# Patient Record
Sex: FEMALE | Race: WHITE | NOT HISPANIC OR LATINO | Employment: UNEMPLOYED | URBAN - METROPOLITAN AREA
[De-identification: names, ages, dates, MRNs, and addresses within clinical notes are randomized per-mention and may not be internally consistent; named-entity substitution may affect disease eponyms.]

---

## 2017-12-16 ENCOUNTER — HOSPITAL ENCOUNTER (EMERGENCY)
Facility: MEDICAL CENTER | Age: 32
End: 2017-12-16
Attending: EMERGENCY MEDICINE
Payer: MEDICAID

## 2017-12-16 VITALS
SYSTOLIC BLOOD PRESSURE: 145 MMHG | WEIGHT: 171.3 LBS | OXYGEN SATURATION: 98 % | HEART RATE: 105 BPM | TEMPERATURE: 99.2 F | HEIGHT: 59 IN | BODY MASS INDEX: 34.53 KG/M2 | DIASTOLIC BLOOD PRESSURE: 81 MMHG | RESPIRATION RATE: 16 BRPM

## 2017-12-16 DIAGNOSIS — F33.2 SEVERE EPISODE OF RECURRENT MAJOR DEPRESSIVE DISORDER, WITHOUT PSYCHOTIC FEATURES (HCC): ICD-10-CM

## 2017-12-16 DIAGNOSIS — R07.9 CHEST PAIN, UNSPECIFIED TYPE: ICD-10-CM

## 2017-12-16 LAB — EKG IMPRESSION: NORMAL

## 2017-12-16 PROCEDURE — 90791 PSYCH DIAGNOSTIC EVALUATION: CPT

## 2017-12-16 PROCEDURE — 93005 ELECTROCARDIOGRAM TRACING: CPT | Performed by: EMERGENCY MEDICINE

## 2017-12-16 PROCEDURE — 99284 EMERGENCY DEPT VISIT MOD MDM: CPT

## 2017-12-16 RX ORDER — LAMOTRIGINE 25 MG/1
25 TABLET ORAL 2 TIMES DAILY
Qty: 60 TAB | Refills: 0 | Status: SHIPPED | OUTPATIENT
Start: 2017-12-16 | End: 2018-01-15

## 2017-12-16 ASSESSMENT — PAIN SCALES - GENERAL: PAINLEVEL_OUTOF10: 0
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
## 2017-12-16 NOTE — ED NOTES
Pt ambulatory to triage with mother & son.  Pt reports feeling suicidal x 4 days with no organized plan.  States she has had constant thoughts but is too scared to do anything.  Reports cutting.  Denies etoh/drug use.  Sad score 4, charge RN aware.  Pt taken to triage 2 for [facility] evaluation.

## 2017-12-16 NOTE — DISCHARGE PLANNING
[facility]  Crisis/Safety Plan    Name:  Lindsey Gomes  MRN:  [id number]  Date:  2017    Warning signs that a crisis may be developing for me or I may be at risk:  1) my anger explodes  2)  Irritability esculates  3) thinking of harming self and actually doing it.    Coping strategies I can use on my own (relaxation, physical activity, etc):  1) meditation  2) taking walk  3) watching fun movie    Ways I can make my environment safe:  1) take walk be fore exploding  2) stop and breath  3) leave the home overnight    Things I want to tell myself when I feel a crisis developin) I desire to be better parent  2) I am a better cook  3) I have a good sense of humor    People I can contact for support or distraction (and their phone numbers):  1) Brianna Gomes [phone]  2) Didier santo  [phone]  3)    If I’m not able to reach my support people, or the above strategies don’t help, I can contact the following professionals, agencies, or hotlines:  1) Crisis Call Center ():  [phone] -OR- [phone]) [phone]  2) Crisis Text Line ():  Text START to 813812  3) -[phone]  4) [facility] [phone]  ([phone])    Howard Levin R.N.

## 2017-12-16 NOTE — ED PROVIDER NOTES
ED Provider Note    Scribed for Hilton Qureshi M.D. by Sidra Deleon. 12/16/2017  12:28 PM    Primary care provider: VENECIA Ochoa  Means of arrival: walk in   History obtained from: patient  History limited by: none    CHIEF COMPLAINT  Chief Complaint   Patient presents with   • Suicidal Ideation     x 3 days      HPI  Lindsey Gomes is a 32 y.o. female who presents to the Emergency Department for suicidal ideation onset three days ago. She notes that the night before last she attempted to overdose on her Metoprolol.  Her ex stepped in and helped calm her down and she did not overdose.  She notes that she has struggled with suicidality for the last five years but she notes that it has worsened over the last several weeks. The patient's mental conditions were successfully treated in mental health court 5 years ago but has recently lost therapy care and has consequently not had her Lamictal since June. She notes that this has made her symptoms and mood very irregular. The patient states that she is not currently suicidal and she feels that if she can be discharged with resources and her lamictal prescription then she will not be concerned about harming herself.  She states that over the last week she has experienced short episodes of chest pain that she describes as sharp and shooting.  She notes that a familial history of heart disease. She denies any shortness of breath or leg swelling.     REVIEW OF SYSTEMS  Pertinent positives include SI, chest pain. Pertinent negatives include no shortness of breath, leg swelling.  All other systems reviewed and negative. C    PAST MEDICAL HISTORY   has a past medical history of Anxiety; Bipolar affective; Depression; Heart murmur; Hypertension; and Pregnancy with nephrolithiasis (12/17/2012).    SURGICAL HISTORY   has a past surgical history that includes lumpectomy (2008); rhinoplasty (2005); craniotomy; and repeat c section  "(11/6/2014).    SOCIAL HISTORY  Social History   Substance Use Topics   • Smoking status: Never Smoker   • Alcohol use No      History   Drug Use No     FAMILY HISTORY  Family History   Problem Relation Age of Onset   • Diabetes Mother    • Thyroid Mother    • Depression Father    • Depression Maternal Grandmother    • Heart Disease Maternal Grandfather      CURRENT MEDICATIONS  No current facility-administered medications on file prior to encounter.      Current Outpatient Prescriptions on File Prior to Encounter   Medication Sig Dispense Refill   • metoprolol SR (TOPROL XL) 50 MG TABLET SR 24 HR Take 50 mg by mouth every day.     • ibuprofen (MOTRIN) 600 MG TABS Take 1 Tab by mouth every 6 hours as needed for Mild Pain. 60 Tab 0   • oxycodone-acetaminophen (PERCOCET) 5-325 MG TABS Take 1-2 Tabs by mouth every four hours as needed. 45 Tab 0       ALLERGIES  Allergies   Allergen Reactions   • Pcn [Penicillins] Hives and Rash     PHYSICAL EXAM  VITAL SIGNS: /81   Pulse (!) 105   Temp 37.3 °C (99.2 °F) (Temporal)   Resp 16   Ht 1.499 m (4' 11\")   Wt 77.7 kg (171 lb 4.8 oz)   SpO2 98%   BMI 34.60 kg/m²     Constitutional: Well developed, Well nourished, mild  distress, Non-toxic appearance.   HENT: Normocephalic, Atraumatic, Bilateral external ears normal, Oropharynx moist, No oral exudates.   Eyes: PERRLA, EOMI, Conjunctiva normal, No discharge.   Neck: No tenderness, Supple, No stridor.   Lymphatic: No lymphadenopathy noted.   Cardiovascular: Normal heart rate, Normal rhythm.   Thorax & Lungs: Clear to auscultation bilaterally, No respiratory distress, No wheezing, No crackles. Slight anterior chest wall tenderness  Abdomen: Soft, No tenderness, No masses, No pulsatile masses.   Skin: Warm, Dry, No erythema, No rash.  Left arm has old appearing superficial abrasions  Extremities:, No edema No cyanosis.   Musculoskeletal: No tenderness to palpation or major deformities noted.  Intact distal " "pulses  Neurologic: Awake, alert. Moves all extremities spontaneously.  Psychiatric: Poor eye contact, fairly good insight    LABS  Results for orders placed or performed during the hospital encounter of 17   EKG (ER)   Result Value Ref Range    Report       [facility] Emergency Dept.    Test Date:  2017  Pt Name:    DALJIT LAWTON             Department: ER  MRN:        [id number]                      Room:       TRIAGE ROOMS  Gender:     F                            Technician: [id number]  :        1985                   Requested By:RUFINO DHALIWAL  Order #:    [id number]                    Reading MD: RUFINO DHALIWAL MD    Measurements  Intervals                                Axis  Rate:       89                           P:          9  LA:         160                          QRS:        23  QRSD:       78                           T:          17  QT:         360  QTc:        439    Interpretive Statements  SINUS RHYTHM  No previous ECG available for comparison    Electronically Signed On 2017 13:16:01 PST by RUFINO DHALIWAL MD     All labs reviewed by me.    COURSE & MEDICAL DECISION MAKING  Pertinent Labs & Imaging studies reviewed. (See chart for details)    I reviewed the patient's medical records    12:28 PM - Patient seen and examined at bedside. [facility] has already discussed resources and a plan for careOrdered EKG to evaluate her symptoms. The differential diagnoses include but are not limited to: anxiety. I discussed plans for discharge with a prescription for Lamictal 25mg tablet. She was given a referral to follow up with mental health services and instructed to return to the ED if her symptoms worsen. Patient understands and agrees. Her vitals prior to discharge are: /81   Pulse (!) 105   Temp 37.3 °C (99.2 °F) (Temporal)   Resp 16   Ht 1.499 m (4' 11\")   Wt 77.7 kg (171 lb 4.8 oz)   SpO2 98%   BMI 34.60 kg/m²     Decision " Making:  Patient with suicidal ideation and she also has some nondescript central chest pain. The patient is not actively suicidal now, we've given the patient resources, we'll restart her medications. I believe she is safe to go home, she has contracted for safety. As for the patient's chest pain, seems very intermittent, sharp, EKG is negative, we'll discharge the patient home, have her follow-up as an outpatient.    The patient will return for new or worsening symptoms and is stable at the time of discharge.      DISPOSITION:  Patient will be discharged home in stable condition.    FOLLOW UP:  [facility], Emergency Dept  [address] [address] [phone]    If symptoms worsen      OUTPATIENT MEDICATIONS:  Discharge Medication List as of 12/16/2017  1:30 PM      START taking these medications    Details   lamotrigine (LAMICTAL) 25 MG Tab Take 1 Tab by mouth 2 times a day for 30 days., Disp-60 Tab, R-0, Print Rx Paper             FINAL IMPRESSION  1. Severe episode of recurrent major depressive disorder, without psychotic features (CMS-HCC)    2. Chest pain, unspecified type          I, Sidra Deleon (Scribe), am scribing for, and in the presence of, Hilton Qureshi M.D..    Electronically signed by: Sidra Deleon (Wandaibtommy), 12/16/2017    IHilton M.D. personally performed the services described in this documentation, as scribed by Sidra Deleon in my presence, and it is both accurate and complete.    The note accurately reflects work and decisions made by me.  Hilton Qureshi  12/16/2017  4:59 PM

## 2017-12-16 NOTE — DISCHARGE INSTRUCTIONS
Please follow-up with your primary care provider for blood pressure management.      Major Depressive Disorder  Major depressive disorder is a mental illness. It also may be called clinical depression or unipolar depression. Major depressive disorder usually causes feelings of sadness, hopelessness, or helplessness. Some people with this disorder do not feel particularly sad but lose interest in doing things they used to enjoy (anhedonia). Major depressive disorder also can cause physical symptoms. It can interfere with work, school, relationships, and other normal everyday activities. The disorder varies in severity but is longer lasting and more serious than the sadness we all feel from time to time in our lives.  Major depressive disorder often is triggered by stressful life events or major life changes. Examples of these triggers include divorce, loss of your job or home, a move, and the death of a family member or close friend. Sometimes this disorder occurs for no obvious reason at all. People who have family members with major depressive disorder or bipolar disorder are at higher risk for developing this disorder, with or without life stressors. Major depressive disorder can occur at any age. It may occur just once in your life (single episode major depressive disorder). It may occur multiple times (recurrent major depressive disorder).  SYMPTOMS  People with major depressive disorder have either anhedonia or depressed mood on nearly a daily basis for at least 2 weeks or longer. Symptoms of depressed mood include:  · Feelings of sadness (blue or down in the dumps) or emptiness.  · Feelings of hopelessness or helplessness.  · Tearfulness or episodes of crying (may be observed by others).  · Irritability (children and adolescents).  In addition to depressed mood or anhedonia or both, people with this disorder have at least four of the following symptoms:  · Difficulty sleeping or sleeping too much.     · Significant change (increase or decrease) in appetite or weight.    · Lack of energy or motivation.  · Feelings of guilt and worthlessness.    · Difficulty concentrating, remembering, or making decisions.  · Unusually slow movement (psychomotor retardation) or restlessness (as observed by others).    · Recurrent wishes for death, recurrent thoughts of self-harm (suicide), or a suicide attempt.  People with major depressive disorder commonly have persistent negative thoughts about themselves, other people, and the world. People with severe major depressive disorder may experience distorted beliefs or perceptions about the world (psychotic delusions). They also may see or hear things that are not real (psychotic hallucinations).  DIAGNOSIS  Major depressive disorder is diagnosed through an assessment by your health care provider. Your health care provider will ask about aspects of your daily life, such as mood, sleep, and appetite, to see if you have the diagnostic symptoms of major depressive disorder. Your health care provider may ask about your medical history and use of alcohol or drugs, including prescription medicines. Your health care provider also may do a physical exam and blood work. This is because certain medical conditions and the use of certain substances can cause major depressive disorder-like symptoms (secondary depression). Your health care provider also may refer you to a mental health specialist for further evaluation and treatment.  TREATMENT  It is important to recognize the symptoms of major depressive disorder and seek treatment. The following treatments can be prescribed for this disorder:    · Medicine. Antidepressant medicines usually are prescribed. Antidepressant medicines are thought to correct chemical imbalances in the brain that are commonly associated with major depressive disorder. Other types of medicine may be added if the symptoms do not respond to antidepressant medicines  alone or if psychotic delusions or hallucinations occur.  · Talk therapy. Talk therapy can be helpful in treating major depressive disorder by providing support, education, and guidance. Certain types of talk therapy also can help with negative thinking (cognitive behavioral therapy) and with relationship issues that trigger this disorder (interpersonal therapy).  A mental health specialist can help determine which treatment is best for you. Most people with major depressive disorder do well with a combination of medicine and talk therapy. Treatments involving electrical stimulation of the brain can be used in situations with extremely severe symptoms or when medicine and talk therapy do not work over time. These treatments include electroconvulsive therapy, transcranial magnetic stimulation, and vagal nerve stimulation.     This information is not intended to replace advice given to you by your health care provider. Make sure you discuss any questions you have with your health care provider.     Document Released: 04/14/2014 Document Revised: 01/08/2016 Document Reviewed: 04/14/2014  [employer] Interactive Patient Education ©2016 [employer]

## 2019-02-03 ENCOUNTER — APPOINTMENT (OUTPATIENT)
Dept: URGENT CARE | Facility: CLINIC | Age: 34
End: 2019-02-03
Payer: MEDICAID

## 2019-02-06 ENCOUNTER — OFFICE VISIT (OUTPATIENT)
Dept: URGENT CARE | Facility: CLINIC | Age: 34
End: 2019-02-06
Payer: MEDICAID

## 2019-02-06 VITALS
BODY MASS INDEX: 32.86 KG/M2 | WEIGHT: 163 LBS | HEART RATE: 99 BPM | HEIGHT: 59 IN | DIASTOLIC BLOOD PRESSURE: 90 MMHG | TEMPERATURE: 100.1 F | RESPIRATION RATE: 14 BRPM | OXYGEN SATURATION: 97 % | SYSTOLIC BLOOD PRESSURE: 110 MMHG

## 2019-02-06 DIAGNOSIS — R10.13 EPIGASTRIC PAIN: ICD-10-CM

## 2019-02-06 LAB
APPEARANCE UR: CLEAR
COLOR UR AUTO: NORMAL
GLUCOSE UR STRIP.AUTO-MCNC: NEGATIVE MG/DL
INT CON NEG: NEGATIVE
INT CON POS: POSITIVE
KETONES UR STRIP.AUTO-MCNC: NEGATIVE MG/DL
LEUKOCYTE ESTERASE UR QL STRIP.AUTO: NORMAL
NITRITE UR QL STRIP.AUTO: NEGATIVE
PH UR STRIP.AUTO: 6.5 [PH] (ref 5–8)
POC URINE PREGNANCY TEST: NEGATIVE
PROT UR QL STRIP: NEGATIVE MG/DL
RBC UR QL AUTO: NEGATIVE
SP GR UR STRIP.AUTO: 1.01

## 2019-02-06 PROCEDURE — 81002 URINALYSIS NONAUTO W/O SCOPE: CPT | Performed by: PHYSICIAN ASSISTANT

## 2019-02-06 PROCEDURE — 81025 URINE PREGNANCY TEST: CPT | Performed by: PHYSICIAN ASSISTANT

## 2019-02-06 PROCEDURE — 99204 OFFICE O/P NEW MOD 45 MIN: CPT | Performed by: PHYSICIAN ASSISTANT

## 2019-02-06 RX ORDER — ZIPRASIDONE HYDROCHLORIDE 60 MG/1
60 CAPSULE ORAL 2 TIMES DAILY
COMMUNITY
End: 2019-02-06

## 2019-02-06 RX ORDER — PAROXETINE 10 MG/1
10 TABLET, FILM COATED ORAL DAILY
COMMUNITY
End: 2019-02-06

## 2019-02-06 ASSESSMENT — ENCOUNTER SYMPTOMS
DIARRHEA: 0
ABDOMINAL PAIN: 1
BELCHING: 1
ARTHRALGIAS: 0
ANOREXIA: 1
WEIGHT LOSS: 0
FLATUS: 0
NAUSEA: 0
CONSTIPATION: 0
VOMITING: 0
HEMATOCHEZIA: 0
HEADACHES: 0
FEVER: 0
MYALGIAS: 0

## 2019-02-06 NOTE — PROGRESS NOTES
Subjective:      Lindsey Gomes is a 33 y.o. female who presents with Abdominal Pain (x 1 week pt states she is concered about liver becuase the pain is similar (UQ PAIN))            Abdominal Pain   This is a new problem. The current episode started in the past 7 days. The onset quality is undetermined. The problem occurs intermittently. The problem has been waxing and waning. The pain is located in the epigastric region. The pain is at a severity of 4/10. The pain is mild. The quality of the pain is aching. The abdominal pain does not radiate. Associated symptoms include anorexia and belching. Pertinent negatives include no arthralgias, constipation, diarrhea, dysuria, fever, flatus, frequency, headaches, hematochezia, hematuria, melena, myalgias, nausea, vomiting or weight loss. Associated symptoms comments: Nausea and a cough. Nothing aggravates the pain. The pain is relieved by nothing. She has tried nothing for the symptoms.     Past medical history: Is not pertinent to this examination  Past surgical history: Not pertinent to this examination  Family history: Is not pertinent to this examination  Allergies: No known drug allergies  Social history: Is reviewed in Epic  Meds:   Current Outpatient Prescriptions on File Prior to Visit   Medication Sig Dispense Refill   • metoprolol SR (TOPROL XL) 50 MG TABLET SR 24 HR Take 50 mg by mouth every day.     • ibuprofen (MOTRIN) 600 MG TABS Take 1 Tab by mouth every 6 hours as needed for Mild Pain. (Patient not taking: Reported on 2/6/2019) 60 Tab 0   • oxycodone-acetaminophen (PERCOCET) 5-325 MG TABS Take 1-2 Tabs by mouth every four hours as needed. (Patient not taking: Reported on 2/6/2019) 45 Tab 0     No current facility-administered medications on file prior to visit.          Review of Systems   Constitutional: Negative for fever and weight loss.   Gastrointestinal: Positive for abdominal pain and anorexia. Negative for constipation, diarrhea, flatus,  "hematochezia, melena, nausea and vomiting.   Genitourinary: Negative for dysuria, frequency and hematuria.   Musculoskeletal: Negative for arthralgias and myalgias.   Neurological: Negative for headaches.          Objective:     /90   Pulse 99   Temp 37.8 °C (100.1 °F)   Resp 14   Ht 1.499 m (4' 11\")   Wt 73.9 kg (163 lb)   SpO2 97%   BMI 32.92 kg/m²      Physical Exam   Constitutional: She is oriented to person, place, and time. She appears well-developed and well-nourished.   HENT:   Head: Normocephalic and atraumatic.   Eyes: Pupils are equal, round, and reactive to light. EOM are normal.   Neck: Normal range of motion.   Cardiovascular: Normal rate, regular rhythm and normal heart sounds.    Pulmonary/Chest: Breath sounds normal.   Abdominal: Soft. She exhibits no distension and no mass. There is tenderness.       Musculoskeletal: Normal range of motion.   Neurological: She is alert and oriented to person, place, and time.   Skin: Skin is warm. Capillary refill takes less than 2 seconds.          Gen.: Patient is A and O ×3, no acute distress, well-nourished well-hydrated  Vitals: Are listed and unremarkable  HEENT: Heads normocephalic, atraumatic, PERRLA, extraocular movements intact, TMs and oropharynx clear  Neck: Soft supple without cervical lymphadenopathy  Cardiovascular: Regular rate and rhythm normal S1 and S2. No murmurs, rubs or gallops  Lungs are clear to auscultation bilaterally. no wheezes rales or rhonchi  Abdomen is soft, tender in the periumbilical region. negative murphys. Good bowel sunds  Skin: Is well perfused without evidence of rash or lesions  Neurological:  cranial nerves II through XII were assessed and intact.  Musculoskeletal: Full range of motion, 5 out of 5 strength against resistance  Neurovascularly: Intact with a 2 second cap refill, good distal pulses    Urgent CARE course: Urinalysis is unremarkable and urine pregnancy is negative    Assessment/Plan:     1. " Epigastric pain    - US-ABDOMEN COMPLETE SURVEY; Future  - CBC WITH DIFFERENTIAL; Future  - Comp Metabolic Panel; Future  If symptoms persist or worsen please go straight to the emergency room

## 2019-02-06 NOTE — LETTER
February 6, 2019         Patient: Lindsey Gomes   YOB: 1985   Date of Visit: 2/6/2019           To Whom it May Concern:    Lindsey Gomes was seen in my clinic on 2/6/2019. She may return to work on 2/7/2019. Please excuse for today.    If you have any questions or concerns, please don't hesitate to call.        Sincerely,           Ladi Walsh P.A.-C.  Electronically Signed

## 2019-06-25 PROCEDURE — 99284 EMERGENCY DEPT VISIT MOD MDM: CPT

## 2019-06-26 ENCOUNTER — HOSPITAL ENCOUNTER (EMERGENCY)
Facility: MEDICAL CENTER | Age: 34
End: 2019-06-26
Attending: EMERGENCY MEDICINE
Payer: MEDICAID

## 2019-06-26 VITALS
HEART RATE: 116 BPM | OXYGEN SATURATION: 96 % | RESPIRATION RATE: 18 BRPM | SYSTOLIC BLOOD PRESSURE: 139 MMHG | HEIGHT: 59 IN | BODY MASS INDEX: 34.67 KG/M2 | TEMPERATURE: 99.7 F | DIASTOLIC BLOOD PRESSURE: 84 MMHG | WEIGHT: 171.96 LBS

## 2019-06-26 DIAGNOSIS — K62.5 RECTAL BLEEDING: ICD-10-CM

## 2019-06-26 DIAGNOSIS — M54.50 ACUTE LOW BACK PAIN WITHOUT SCIATICA, UNSPECIFIED BACK PAIN LATERALITY: ICD-10-CM

## 2019-06-26 DIAGNOSIS — N39.0 URINARY TRACT INFECTION WITHOUT HEMATURIA, SITE UNSPECIFIED: ICD-10-CM

## 2019-06-26 LAB
ALBUMIN SERPL BCP-MCNC: 4.3 G/DL (ref 3.2–4.9)
ALBUMIN/GLOB SERPL: 1.6 G/DL
ALP SERPL-CCNC: 44 U/L (ref 30–99)
ALT SERPL-CCNC: 18 U/L (ref 2–50)
ANION GAP SERPL CALC-SCNC: 10 MMOL/L (ref 0–11.9)
APPEARANCE UR: CLEAR
AST SERPL-CCNC: 19 U/L (ref 12–45)
BACTERIA #/AREA URNS HPF: ABNORMAL /HPF
BASOPHILS # BLD AUTO: 0.3 % (ref 0–1.8)
BASOPHILS # BLD: 0.02 K/UL (ref 0–0.12)
BILIRUB SERPL-MCNC: 0.3 MG/DL (ref 0.1–1.5)
BILIRUB UR QL STRIP.AUTO: NEGATIVE
BUN SERPL-MCNC: 13 MG/DL (ref 8–22)
CALCIUM SERPL-MCNC: 9.2 MG/DL (ref 8.5–10.5)
CHLORIDE SERPL-SCNC: 104 MMOL/L (ref 96–112)
CO2 SERPL-SCNC: 23 MMOL/L (ref 20–33)
COLOR UR: YELLOW
CREAT SERPL-MCNC: 0.88 MG/DL (ref 0.5–1.4)
EOSINOPHIL # BLD AUTO: 0.04 K/UL (ref 0–0.51)
EOSINOPHIL NFR BLD: 0.7 % (ref 0–6.9)
EPI CELLS #/AREA URNS HPF: ABNORMAL /HPF
ERYTHROCYTE [DISTWIDTH] IN BLOOD BY AUTOMATED COUNT: 38.4 FL (ref 35.9–50)
GLOBULIN SER CALC-MCNC: 2.7 G/DL (ref 1.9–3.5)
GLUCOSE SERPL-MCNC: 132 MG/DL (ref 65–99)
GLUCOSE UR STRIP.AUTO-MCNC: NEGATIVE MG/DL
HCG SERPL QL: NEGATIVE
HCT VFR BLD AUTO: 41.9 % (ref 37–47)
HGB BLD-MCNC: 13.8 G/DL (ref 12–16)
HYALINE CASTS #/AREA URNS LPF: ABNORMAL /LPF
IMM GRANULOCYTES # BLD AUTO: 0.01 K/UL (ref 0–0.11)
IMM GRANULOCYTES NFR BLD AUTO: 0.2 % (ref 0–0.9)
KETONES UR STRIP.AUTO-MCNC: NEGATIVE MG/DL
LEUKOCYTE ESTERASE UR QL STRIP.AUTO: ABNORMAL
LIPASE SERPL-CCNC: 21 U/L (ref 11–82)
LYMPHOCYTES # BLD AUTO: 0.55 K/UL (ref 1–4.8)
LYMPHOCYTES NFR BLD: 9.1 % (ref 22–41)
MCH RBC QN AUTO: 29.9 PG (ref 27–33)
MCHC RBC AUTO-ENTMCNC: 32.9 G/DL (ref 33.6–35)
MCV RBC AUTO: 90.7 FL (ref 81.4–97.8)
MICRO URNS: ABNORMAL
MONOCYTES # BLD AUTO: 0.11 K/UL (ref 0–0.85)
MONOCYTES NFR BLD AUTO: 1.8 % (ref 0–13.4)
NEUTROPHILS # BLD AUTO: 5.3 K/UL (ref 2–7.15)
NEUTROPHILS NFR BLD: 87.9 % (ref 44–72)
NITRITE UR QL STRIP.AUTO: NEGATIVE
NRBC # BLD AUTO: 0 K/UL
NRBC BLD-RTO: 0 /100 WBC
PH UR STRIP.AUTO: 5 [PH]
PLATELET # BLD AUTO: 132 K/UL (ref 164–446)
PMV BLD AUTO: 10.7 FL (ref 9–12.9)
POTASSIUM SERPL-SCNC: 3.5 MMOL/L (ref 3.6–5.5)
PROT SERPL-MCNC: 7 G/DL (ref 6–8.2)
PROT UR QL STRIP: NEGATIVE MG/DL
RBC # BLD AUTO: 4.62 M/UL (ref 4.2–5.4)
RBC # URNS HPF: ABNORMAL /HPF
RBC UR QL AUTO: NEGATIVE
SODIUM SERPL-SCNC: 137 MMOL/L (ref 135–145)
SP GR UR STRIP.AUTO: 1.02
UROBILINOGEN UR STRIP.AUTO-MCNC: 0.2 MG/DL
WBC # BLD AUTO: 6 K/UL (ref 4.8–10.8)
WBC #/AREA URNS HPF: ABNORMAL /HPF

## 2019-06-26 PROCEDURE — 87086 URINE CULTURE/COLONY COUNT: CPT

## 2019-06-26 PROCEDURE — 700102 HCHG RX REV CODE 250 W/ 637 OVERRIDE(OP): Performed by: EMERGENCY MEDICINE

## 2019-06-26 PROCEDURE — 80053 COMPREHEN METABOLIC PANEL: CPT

## 2019-06-26 PROCEDURE — 83690 ASSAY OF LIPASE: CPT

## 2019-06-26 PROCEDURE — A9270 NON-COVERED ITEM OR SERVICE: HCPCS | Performed by: EMERGENCY MEDICINE

## 2019-06-26 PROCEDURE — 96372 THER/PROPH/DIAG INJ SC/IM: CPT

## 2019-06-26 PROCEDURE — 700111 HCHG RX REV CODE 636 W/ 250 OVERRIDE (IP): Performed by: EMERGENCY MEDICINE

## 2019-06-26 PROCEDURE — 36415 COLL VENOUS BLD VENIPUNCTURE: CPT

## 2019-06-26 PROCEDURE — 85025 COMPLETE CBC W/AUTO DIFF WBC: CPT

## 2019-06-26 PROCEDURE — 81001 URINALYSIS AUTO W/SCOPE: CPT

## 2019-06-26 PROCEDURE — 87186 SC STD MICRODIL/AGAR DIL: CPT

## 2019-06-26 PROCEDURE — 84703 CHORIONIC GONADOTROPIN ASSAY: CPT

## 2019-06-26 PROCEDURE — 87077 CULTURE AEROBIC IDENTIFY: CPT

## 2019-06-26 RX ORDER — HYDROXYZINE HYDROCHLORIDE 25 MG/1
25 TABLET, FILM COATED ORAL 3 TIMES DAILY PRN
COMMUNITY

## 2019-06-26 RX ORDER — NITROFURANTOIN 25; 75 MG/1; MG/1
100 CAPSULE ORAL ONCE
Status: COMPLETED | OUTPATIENT
Start: 2019-06-26 | End: 2019-06-26

## 2019-06-26 RX ORDER — ZIPRASIDONE HYDROCHLORIDE 60 MG/1
60 CAPSULE ORAL EVERY EVENING
Status: SHIPPED | COMMUNITY
End: 2021-08-29

## 2019-06-26 RX ORDER — NITROFURANTOIN 25; 75 MG/1; MG/1
100 CAPSULE ORAL 2 TIMES DAILY
Qty: 14 CAP | Refills: 0 | Status: SHIPPED | OUTPATIENT
Start: 2019-06-26 | End: 2019-07-03

## 2019-06-26 RX ORDER — KETOROLAC TROMETHAMINE 30 MG/ML
30 INJECTION, SOLUTION INTRAMUSCULAR; INTRAVENOUS ONCE
Status: COMPLETED | OUTPATIENT
Start: 2019-06-26 | End: 2019-06-26

## 2019-06-26 RX ORDER — DIPHENHYDRAMINE HYDROCHLORIDE 50 MG/ML
50 INJECTION INTRAMUSCULAR; INTRAVENOUS ONCE
Status: COMPLETED | OUTPATIENT
Start: 2019-06-26 | End: 2019-06-26

## 2019-06-26 RX ORDER — CYCLOBENZAPRINE HCL 10 MG
10 TABLET ORAL ONCE
Status: COMPLETED | OUTPATIENT
Start: 2019-06-26 | End: 2019-06-26

## 2019-06-26 RX ORDER — PRAZOSIN HYDROCHLORIDE 2 MG/1
2 CAPSULE ORAL NIGHTLY
Status: SHIPPED | COMMUNITY
End: 2022-02-19

## 2019-06-26 RX ADMIN — KETOROLAC TROMETHAMINE 30 MG: 30 INJECTION, SOLUTION INTRAMUSCULAR at 01:22

## 2019-06-26 RX ADMIN — CYCLOBENZAPRINE 10 MG: 10 TABLET, FILM COATED ORAL at 01:22

## 2019-06-26 RX ADMIN — DIPHENHYDRAMINE HYDROCHLORIDE 50 MG: 50 INJECTION INTRAMUSCULAR; INTRAVENOUS at 01:22

## 2019-06-26 RX ADMIN — NITROFURANTOIN MONOHYDRATE/MACROCRYSTALLINE 100 MG: 25; 75 CAPSULE ORAL at 01:38

## 2019-06-26 ASSESSMENT — ENCOUNTER SYMPTOMS
FEVER: 0
ROS GI COMMENTS: POSITIVE FOR RECTAL BLEEDING.
HEADACHES: 0
DIARRHEA: 1
CONSTIPATION: 0
SHORTNESS OF BREATH: 0
BACK PAIN: 1

## 2019-06-26 NOTE — ED PROVIDER NOTES
"ED Provider Note    Scribed for Juliette Schultz M.D. by Anna Kapoor. 6/26/2019, 12:33 AM.    Primary care provider: VENECIA Ochoa  Means of arrival: Walk in  History obtained from: Patient  History limited by: None    CHIEF COMPLAINT  Chief Complaint   Patient presents with   • Low Back Pain     in the kidney area x1 day   • Rectal Bleeding     noticed a small blood clot in underwear       HPI  Lindsey Gomes is a 33 y.o. female who presents to the Emergency Department for evaluation of lower back pain onset 3-4 days ago. She rates the pain as an 8-9/10 in severity and describes it as \"burning\" in quality. The patient denies any strenuous activities prior to onset of the pain.  Denies abdominal pain, nausea, vomiting, dysuria, and changes in bowel movements.    She additionally has complaints of intermittent rectal bleeding that has been ongoing for a month.  The patient reports that she has no prior history of abdominal bleeding or ulcers.  She denies constipation.    REVIEW OF SYSTEMS  Review of Systems   Constitutional: Negative for fever.   Respiratory: Negative for shortness of breath.    Cardiovascular: Negative for chest pain.   Gastrointestinal: Positive for diarrhea. Negative for constipation.        Positive for rectal bleeding.    Musculoskeletal: Positive for back pain (lower).   Neurological: Negative for headaches.   All other systems reviewed and are negative.    PAST MEDICAL HISTORY   has a past medical history of Anxiety; Bipolar affective (HCC); Depression; Heart murmur; Hypertension; and Pregnancy with nephrolithiasis (12/17/2012).    SURGICAL HISTORY   has a past surgical history that includes lumpectomy (2008); rhinoplasty (2005); craniotomy; and repeat c section (11/6/2014).    SOCIAL HISTORY  Social History   Substance Use Topics   • Smoking status: Never Smoker   • Smokeless tobacco: Never Used   • Alcohol use No      History   Drug Use No       FAMILY HISTORY  Family " "History   Problem Relation Age of Onset   • Diabetes Mother    • Thyroid Mother    • Depression Father    • Depression Maternal Grandmother    • Heart Disease Maternal Grandfather        CURRENT MEDICATIONS  Home Medications     Reviewed by Niurka Cerna R.N. (Registered Nurse) on 06/26/19 at 0017  Med List Status: Not Addressed   Medication Last Dose Status   hydrOXYzine HCl (ATARAX) 25 MG Tab 6/26/2019 Active   metoprolol SR (TOPROL XL) 50 MG TABLET SR 24 HR 6/26/2019 Active   prazosin (MINIPRESS) 2 MG Cap 6/26/2019 Active   ziprasidone (GEODON) 60 MG Cap 4/16/2019 Active                ALLERGIES  Allergies   Allergen Reactions   • Pcn [Penicillins] Hives and Rash       PHYSICAL EXAM  VITAL SIGNS: /87   Pulse (!) 127   Temp 37.6 °C (99.7 °F) (Temporal)   Resp 16   Ht 1.499 m (4' 11\")   Wt 78 kg (171 lb 15.3 oz)   SpO2 96%   BMI 34.73 kg/m²   Vitals reviewed by myself.  Physical Exam   Nursing note and vitals reviewed.  Constitutional: Well-developed and well-nourished. No acute distress.   HENT: Head is normocephalic and atraumatic.  Eyes: extra-ocular movements intact  Cardiovascular: Tachycardic rate and regular rhythm. No murmur heard.  Pulmonary/Chest: Breath sounds normal. No wheezes or rales.   Abdominal: Soft and non-tender. No distention.  No CVA tenderness bilaterally  Rectal: External hemorrhoids with scant red blood surrounding them, likely source of bleeding. No blood noted on internal rectal exam. No stool available for GUAIAC testing.   Musculoskeletal: Extremities exhibit normal range of motion without edema or tenderness.   Neurological: Awake and alert  Skin: Skin is warm and dry. No rash.       DIAGNOSTIC STUDIES /  LABS  Labs Reviewed   CBC WITH DIFFERENTIAL - Abnormal; Notable for the following:        Result Value    MCHC 32.9 (*)     Platelet Count 132 (*)     Neutrophils-Polys 87.90 (*)     Lymphocytes 9.10 (*)     Lymphs (Absolute) 0.55 (*)     All other components " within normal limits   COMP METABOLIC PANEL - Abnormal; Notable for the following:     Potassium 3.5 (*)     Glucose 132 (*)     All other components within normal limits   URINALYSIS,CULTURE IF INDICATED - Abnormal; Notable for the following:     Leukocyte Esterase Small (*)     All other components within normal limits   URINE MICROSCOPIC (W/UA) - Abnormal; Notable for the following:     WBC 10-20 (*)     Bacteria Many (*)     Epithelial Cells Moderate (*)     Hyaline Cast 6-10 (*)     All other components within normal limits   LIPASE   HCG QUAL SERUM   URINE CULTURE(NEW)   ESTIMATED GFR       All labs reviewed by me.      REASSESSMENT  12:35 AM - Patient presents to the ED with lower back pain and rectal bleeding. Patient will be treated with Toradol 30 mg, Flexeril 10 mg, and Benadryl 50 mg. Patient made aware labs will be ordered for further evaluation. She consents to plan.     1:29 AM - Reviewed patient's lab results which indicate UTI. She will be given first dose of Macrobid 100 mg here tonight.    1:37 AM - Patient was reevaluated at bedside. She is resting comfortably in bed. Discussed UA results with the patient that indicate a UTI. She was informed I am still awaiting for all of labs to result, but initial labs of her blood counts were reassuring. The patient was made aware following the rest of her blood work coming back reassuring, she will be discharged home. She is instructed to follow up with PCP. The patient is understanding and agreeable to discharge.     COURSE & MEDICAL DECISION MAKING  Nursing notes, VS, PMSFHx reviewed in chart.    Patient is a 33-year-old female who comes in for low back pain and rectal bleeding.  Differential diagnosis includes hemorrhoid bleeding, lower GI bleed, anemia, musculoskeletal strain, urinary tract infection.  Diagnostic work-up includes labs and urinalysis.      Patient's initial vitals notable for tachycardia in the 120s.  On exam patient is well-appearing and  does not appear to be in discomfort.  I did a chart review and prior records dating back to 2017 demonstrates that patient is chronically tachycardic, therefore I am not concerned about this at this time.  She is treated with oral hydration after which her tachycardia improves to 116.  Of note patient's labs returned and hemoglobin is normal.  I believe the likely source of her intermittent rectal bleeding is external hemorrhoids, however I have advised her to follow-up with gastroenterology for any persistent symptoms as we cannot rule out tumor in the emergency department.  Patient understands and is agreeable to this.  Labs are otherwise unremarkable.  Urinalysis is consistent with infection for which patient is started on Macrobid.  Her back pain was treated with Toradol and Flexeril after which she felt improved.  Patient is advised on management of UTI and given strict return precautions.  She is advised to follow-up with PCP in 1 to 2 days.  Patient is then discharged home in stable condition.    The patient will return for new or worsening symptoms and is stable at the time of discharge.    DISPOSITION:  Patient will be discharged home in stable condition.    FOLLOW UP:  Rosa Levin, AuWPWuN.  [address]   [facility] [address]  [facility] [address]  [phone]            OUTPATIENT MEDICATIONS:  Discharge Medication List as of 6/26/2019  1:42 AM      START taking these medications    Details   nitrofurantoin monohyd macro (MACROBID) 100 MG Cap Take 1 Cap by mouth 2 times a day for 7 days., Disp-14 Cap, R-0, Print Rx Paper               FINAL IMPRESSION  1. Urinary tract infection without hematuria, site unspecified    2. Acute low back pain without sciatica, unspecified back pain laterality    3. Rectal bleeding          Anna WALLER ([place]), am scribing for, and in the presence of, Juliette Schultz M.D..    Electronically signed by: Anna Macias), 6/26/2019    Juliette WALLER M.D. personally performed  the services described in this documentation, as scribed by Anna Kapoor in my presence, and it is both accurate and complete.    C.    The note accurately reflects work and decisions made by me.  Juliette Schultz  6/26/2019  1:53 AM

## 2019-06-26 NOTE — LETTER
6/28/2019               Lindsey Faye Eliseo  [address]  [place] [address]        Dear Lindsey (MR#[id number])    This letter is sent in regards to your, recent visit to the [facility] Emergency Department on 6/25/2019.  During the visit, tests were performed to assist the physician in a medical diagnosis.  A review of those tests requires that we notify you of the following:    Your urine culture was POSITIVE for a bacteria called Escherichia coli. The antibiotic prescribed for you (nitrofurantoin) should be active to treat this bacteria. IT IS IMPORTANT THAT YOU CONTINUE TAKING YOUR ANTIBIOTIC UNTIL IT IS FINISHED.      Please feel free to contact me at the number below if you have any questions or concerns. Thank you for your cooperation in the matter.    Sincerely,  ED Culture Follow-Up Staff  Cleo Charles, PharmD    [facility], Emergency Department  [address], [place] [address]  [phone] (ED Culture Line)  [phone]

## 2019-06-26 NOTE — ED TRIAGE NOTES
"Chief Complaint   Patient presents with   • Low Back Pain     in the kidney area x1 day   • Rectal Bleeding     noticed a small blood clot in underwear     /87   Pulse (!) 127   Temp 37.6 °C (99.7 °F) (Temporal)   Resp 16   Ht 1.499 m (4' 11\")   Wt 78 kg (171 lb 15.3 oz)   SpO2 96%     Pt ambulates with a steady gait to triage with above complaints. No distress noted, states her low back has been hurting and she has been experiencing intermittent cramping x3 days.   "

## 2019-06-28 LAB
BACTERIA UR CULT: ABNORMAL
BACTERIA UR CULT: ABNORMAL
SIGNIFICANT IND 70042: ABNORMAL
SITE SITE: ABNORMAL
SOURCE SOURCE: ABNORMAL

## 2019-06-28 NOTE — ED NOTES
"ED Positive Culture Follow-up/Notification Note:    Date: 6/28/19     Patient seen in the ED on 6/25/2019 for lower back pain x 3 days.   1. Urinary tract infection without hematuria, site unspecified    2. Acute low back pain without sciatica, unspecified back pain laterality    3. Rectal bleeding       Discharge Medication List as of 6/26/2019  1:42 AM      START taking these medications    Details   nitrofurantoin monohyd macro (MACROBID) 100 MG Cap Take 1 Cap by mouth 2 times a day for 7 days., Disp-14 Cap, R-0, Print Rx Paper             Allergies: Pcn [penicillins]     Vitals:    06/25/19 2358 06/26/19 0012 06/26/19 0146   BP: 145/87  139/84   Pulse: (!) 127  (!) 116   Resp: 16  18   Temp: 37.6 °C (99.7 °F)     TempSrc: Temporal     SpO2: 96%     Weight:  78 kg (171 lb 15.3 oz)    Height: 1.499 m (4' 11\")         Final cultures:   Results     Procedure Component Value Units Date/Time    URINE CULTURE(NEW) [[id number]]  (Abnormal)  (Susceptibility) Collected:  06/26/19 0101    Order Status:  Completed Specimen:  Urine Updated:  06/28/19 0838     Significant Indicator POS (POS)     Source UR     Site -     Culture Result - (A)      Escherichia coli  >100,000 cfu/mL   (A)    Culture & Susceptibility     ESCHERICHIA COLI     Antibiotic Sensitivity Microscan Unit Status    Ampicillin Resistant >16 mcg/mL Final    Method: YOUSUF    Cefepime Sensitive <=8 mcg/mL Final    Method: YOUSUF    Cefotaxime Sensitive <=2 mcg/mL Final    Method: YOUSUF    Cefotetan Sensitive <=16 mcg/mL Final    Method: YOUSUF    Ceftazidime Sensitive <=1 mcg/mL Final    Method: YOUSUF    Ceftriaxone Sensitive <=8 mcg/mL Final    Method: YOUSUF    Cefuroxime Sensitive <=4 mcg/mL Final    Method: YOUSUF    Cephalothin Intermediate 16 mcg/mL Final    Method: YOUSUF    Ciprofloxacin Sensitive <=1 mcg/mL Final    Method: YOUSUF    Gentamicin Sensitive <=4 mcg/mL Final    Method: YOUSUF    Levofloxacin Sensitive <=2 mcg/mL Final    Method: YOUSUF    Nitrofurantoin Sensitive <=32 " mcg/mL Final    Method: YOUSUF    Pip/Tazobactam Sensitive <=16 mcg/mL Final    Method: YOUSUF    Piperacillin Resistant >64 mcg/mL Final    Method: YOUSUF    Tigecycline Sensitive <=2 mcg/mL Final    Method: YOUSUF    Tobramycin Sensitive <=4 mcg/mL Final    Method: YOUSUF    Trimeth/Sulfa Sensitive <=2/38 mcg/mL Final    Method: YOUSUF                       URINALYSIS,CULTURE IF INDICATED [[id number]]  (Abnormal) Collected:  06/26/19 0101    Order Status:  Completed Specimen:  Blood Updated:  06/26/19 0123     Color Yellow     Character Clear     Specific Gravity 1.023     Ph 5.0     Glucose Negative mg/dL      Ketones Negative mg/dL      Protein Negative mg/dL      Bilirubin Negative     Urobilinogen, Urine 0.2     Nitrite Negative     Leukocyte Esterase Small (A)     Occult Blood Negative     Micro Urine Req Microscopic          Plan:   Appropriate antibiotic therapy prescribed. No changes required based upon culture result.  Sent letter to patient to notify of positive culture result and encourage compliance with prescribed antibiotics.     Cleo Charles

## 2020-02-14 ENCOUNTER — GYNECOLOGY VISIT (OUTPATIENT)
Dept: OBGYN | Facility: CLINIC | Age: 35
End: 2020-02-14
Payer: MEDICAID

## 2020-02-14 VITALS — WEIGHT: 173 LBS | BODY MASS INDEX: 34.94 KG/M2 | DIASTOLIC BLOOD PRESSURE: 80 MMHG | SYSTOLIC BLOOD PRESSURE: 122 MMHG

## 2020-02-14 DIAGNOSIS — E66.9 OBESITY (BMI 30.0-34.9): ICD-10-CM

## 2020-02-14 DIAGNOSIS — Z30.09 ENCOUNTER FOR OTHER GENERAL COUNSELING OR ADVICE ON CONTRACEPTION: ICD-10-CM

## 2020-02-14 PROCEDURE — 99203 OFFICE O/P NEW LOW 30 MIN: CPT | Performed by: OBSTETRICS & GYNECOLOGY

## 2020-02-14 NOTE — NON-PROVIDER
Pt here today as a New patient  Wants to discuss getting a BTL  Patient states that she doesn't want anymore kids.  Pt states that she has the Nexplanon  Phone # [phone]  Pharmacy verified.  Mirena consent signed and scanned in media.

## 2020-02-14 NOTE — PROGRESS NOTES
Chief Complaint   Patient presents with   • New Patient       History of present illness: 34 y.o.  presents with above chief complaint. Pt is interested in birth control, specifically BTL.  She is currently using Nexplanon.  Birth control methods used in the past are condoms.     PGYNHx: menarche 11, irregular   Last pap 5 years ago, no hx STDs    Review of systems:  Pertinent positives documented in HPI and all other systems reviewed & are negative    All PMH, PSH, allergies, social history and FH reviewed and updated today:  Past Medical History:   Diagnosis Date   • Anxiety     Dx    • Bipolar affective (HCC)    • Depression     Dx in    • Heart murmur     Dx as a child,  Pt states no longer having it.   • Hypertension    • Pregnancy with nephrolithiasis 2012       Past Surgical History:   Procedure Laterality Date   • REPEAT C SECTION  2014    Performed by Stephani Swanson M.D. at [facility]   • LUMPECTOMY  2008    reduction   • RHINOPLASTY     • CRANIOTOMY      open cranium at 3 months old       Allergies:   Allergies   Allergen Reactions   • Pcn [Penicillins] Hives and Rash       Social History     Socioeconomic History   • Marital status: Single     Spouse name: Not on file   • Number of children: Not on file   • Years of education: Not on file   • Highest education level: Not on file   Occupational History   • Not on file   Social Needs   • Financial resource strain: Not on file   • Food insecurity     Worry: Not on file     Inability: Not on file   • Transportation needs     Medical: Not on file     Non-medical: Not on file   Tobacco Use   • Smoking status: Never Smoker   • Smokeless tobacco: Never Used   Substance and Sexual Activity   • Alcohol use: No   • Drug use: No   • Sexual activity: Yes     Partners: Male   Lifestyle   • Physical activity     Days per week: Not on file     Minutes per session: Not on file   • Stress: Not on file   Relationships   • Social  connections     Talks on phone: Not on file     Gets together: Not on file     Attends Restoration service: Not on file     Active member of club or organization: Not on file     Attends meetings of clubs or organizations: Not on file     Relationship status: Not on file   • Intimate partner violence     Fear of current or ex partner: Not on file     Emotionally abused: Not on file     Physically abused: Not on file     Forced sexual activity: Not on file   Other Topics Concern   • Not on file   Social History Narrative    ** Merged History Encounter **         ** Merged History Encounter **            Family History   Problem Relation Age of Onset   • Diabetes Mother    • Thyroid Mother    • Depression Father    • Heart Disease Father    • Diabetes Father    • Heart Disease Maternal Grandfather    • Diabetes Maternal Grandfather        Physical exam:  /80 (BP Location: Right arm, Patient Position: Sitting)   Wt 78.5 kg (173 lb)     GENERAL APPEARANCE: healthy, alert, no distress  ABDOMEN Abdomen soft, non-tender. BS normal. No masses,  No organomegaly  FEMALE GYN: normal female external genitalia without lesions, clear and white vaginal discharge noted, vulva pink without erythema or friability, urethra is normal without discharge or scarring, no bladder fullness or masses, normal uterus, size and consistency, adnexa unable to be palpated due to body habitus, normal anus and perineum.  EXTREMITIES:negative clubbing, cyanosis, edema, nexplanon difficult to palpate in arm due to deep location  NEURO Awake, alert and oriented x 3, Normal gait, no sensory deficits  SKIN No rashes, or ulcers or lesions seen  PSYCHIATRIC: Patient shows appropriate affect, is alert and oriented x3, intact judgment and insight.    Assessment/Plan:  1. Encounter for other general counseling or advice on contraception     2. Obesity (BMI 30.0-34.9)       Patient Orignally wanted BTL, but did not want to undergo the risks.    Counseling/coordination of care of birth control options including medical and surgical methods. Discussed in detail risk and benefits of OCP, NuvaRing, Patch, Depo, IUDs, Nexplanon as well as the normal side effects of each. Questions answered. Patient desires Mirena IUD for birth control and Nexplanon removed. Pt is to use Nexplanon or pelvic rest until placement of product.  Explained location of Nexplanon may prove difficult to remove due to deep location.

## 2020-02-21 LAB — PHYSICIAN READ PAP  881411: NOT DETECTED

## 2020-02-27 ENCOUNTER — GYNECOLOGY VISIT (OUTPATIENT)
Dept: OBGYN | Facility: CLINIC | Age: 35
End: 2020-02-27
Payer: MEDICAID

## 2020-02-27 VITALS — WEIGHT: 174 LBS | BODY MASS INDEX: 35.14 KG/M2 | DIASTOLIC BLOOD PRESSURE: 84 MMHG | SYSTOLIC BLOOD PRESSURE: 126 MMHG

## 2020-02-27 DIAGNOSIS — Z30.09 STERILIZATION CONSULT: ICD-10-CM

## 2020-02-27 PROCEDURE — 99213 OFFICE O/P EST LOW 20 MIN: CPT | Performed by: OBSTETRICS & GYNECOLOGY

## 2020-02-27 NOTE — PROGRESS NOTES
Chief Complaint   Patient presents with   • Gynecologic Exam   Chief complaint: Desires sterilization    History of present illness: 34 y.o.  presents with above chief complaint. Pt is interested in birth control, specifically sterilization.  She is currently using Nexplanon.        PGYNHx: None. last pap 2020, no hx STDs    Review of systems:  Pertinent positives documented in HPI and all other systems reviewed & are negative    All PMH, PSH, allergies, social history and FH reviewed and updated today:  Past Medical History:   Diagnosis Date   • Anxiety     Dx    • Bipolar affective (HCC)    • Depression     Dx in    • Heart murmur     Dx as a child,  Pt states no longer having it.   • Hypertension    • Pregnancy with nephrolithiasis 2012       Past Surgical History:   Procedure Laterality Date   • REPEAT C SECTION  2014    Performed by Stephani Swanson M.D. [facility]   • LUMPECTOMY  2008    reduction   • RHINOPLASTY     • CRANIOTOMY      open cranium at 3 months old       Allergies:   Allergies   Allergen Reactions   • Pcn [Penicillins] Hives and Rash       Social History     Socioeconomic History   • Marital status: Single     Spouse name: Not on file   • Number of children: Not on file   • Years of education: Not on file   • Highest education level: Not on file   Occupational History   • Not on file   Social Needs   • Financial resource strain: Not on file   • Food insecurity     Worry: Not on file     Inability: Not on file   • Transportation needs     Medical: Not on file     Non-medical: Not on file   Tobacco Use   • Smoking status: Never Smoker   • Smokeless tobacco: Never Used   Substance and Sexual Activity   • Alcohol use: No   • Drug use: No   • Sexual activity: Yes     Partners: Male   Lifestyle   • Physical activity     Days per week: Not on file     Minutes per session: Not on file   • Stress: Not on file   Relationships   • Social connections      Talks on phone: Not on file     Gets together: Not on file     Attends Yarsanism service: Not on file     Active member of club or organization: Not on file     Attends meetings of clubs or organizations: Not on file     Relationship status: Not on file   • Intimate partner violence     Fear of current or ex partner: Not on file     Emotionally abused: Not on file     Physically abused: Not on file     Forced sexual activity: Not on file   Other Topics Concern   • Not on file   Social History Narrative    ** Merged History Encounter **         ** Merged History Encounter **            Family History   Problem Relation Age of Onset   • Diabetes Mother    • Thyroid Mother    • Depression Father    • Heart Disease Father    • Diabetes Father    • Heart Disease Maternal Grandfather    • Diabetes Maternal Grandfather        Physical exam:  /84   Wt 78.9 kg (174 lb)     GENERAL APPEARANCE: healthy, alert, no distress  NECK nontender, no masses, thyromegaly or nodules  HEART RRR with normal S1 and S2 ,no murmurs, no gallops, no peripheral edema  LUNG clear to auscultation, normal respiratory effort  ABDOMEN Abdomen soft, non-tender. BS normal. No masses,  No organomegaly  FEMALE GYN: Deferred  EXTREMITIES:negative clubbing, cyanosis, edema    NEURO Awake, alert and oriented x 3, Normal gait, no sensory deficits  SKIN No rashes, or ulcers or lesions seen  PSYCHIATRIC: Patient shows appropriate affect, is alert and oriented x3, intact judgment and insight.    Assessment/Plan:  1. Sterilization consult         Counseling/coordination of care of birth control options including medical and surgical methods. Discussed in detail risk and benefits of OCP, NuvaRing, Patch, Depo, IUDs, Nexplanon as well as the normal side effects of each. Questions answered. Patient desires sterilization for birth control and referral placed.    Counseled patient on the risks, benefits, and alternatives to tubal ligation surgery. Pt aware  of risk for pain, infection, bleeding, transfusion, injury to adjacent organs, anesthesia complications and death with surgery in general. Pt aware BTL is permanent surgical sterilization.  Failure rate is approx 1% or lower with increased risk of ectopic pregnancy if tubal fails. Pt aware of reversible contraceptive methods available to her including OCPs, depo, ring, patch, IUD, nexplanon, condoms. She declines these methods.  Pt has completed child bearing and desires tubal ligation surgery.    Offered both sterilization with Filshie clips versus bilateral salpingectomy.  The pros and cons of both methods were discussed with the patient.  At this time she would like to undergo bilateral salpingectomy    All questions answered

## 2020-03-10 ENCOUNTER — GYNECOLOGY VISIT (OUTPATIENT)
Dept: OBGYN | Facility: CLINIC | Age: 35
End: 2020-03-10
Payer: MEDICAID

## 2020-03-10 VITALS — DIASTOLIC BLOOD PRESSURE: 84 MMHG | WEIGHT: 177.5 LBS | SYSTOLIC BLOOD PRESSURE: 126 MMHG | BODY MASS INDEX: 35.85 KG/M2

## 2020-03-10 DIAGNOSIS — Z97.5 NEXPLANON IN PLACE: ICD-10-CM

## 2020-03-10 DIAGNOSIS — Z30.09 STERILIZATION CONSULT: ICD-10-CM

## 2020-03-10 ASSESSMENT — FIBROSIS 4 INDEX: FIB4 SCORE: 1.19

## 2020-03-10 NOTE — PROGRESS NOTES
Subjective:      Lindsey Gomes is a 34 y.o. female who presents with Pre-op Exam (Laparoscopic Bilateral Salpingectomy)            HPI    ROS       Objective:     LMP  (LMP Unknown)      Physical Exam            Assessment/Plan:       There are no diagnoses linked to this encounter.

## 2020-03-10 NOTE — PROGRESS NOTES
Pt. Here for Pre Op visit for Laparoscopic Bilateral Salpingectomy on 4/1/2020 @ 10:00 am.  Good # [phone]  Pt would like to know if surgery date could be changed to spring break dates so her Mom could be here to help her.   Pt states had Nexplanon placed about 3-4 years.   Pharmacy verified.   BTL consent form signed 2/27/2020

## 2020-03-10 NOTE — PROGRESS NOTES
History and Physical Exam    Chief Complaint   Patient presents with   • Pre-op Exam     Laparoscopic Bilateral Salpingectomy   : Desires sterilization    History of present illness: 34 y.o.  3 para 1-2-0-3 who desires sterilization.. Risks, benefits and alternative therapies have been discussed and patient still desires sterilization. Questions answered.    Patient was reports she has a Nexplanon in her left upper extremity present for the last 3 to 4 years.  It also needs to be removed at that time    Pertinent positives documented in HPI and all other systems reviewed & are negative      All PMH, PSH, allergies, social history and FH reviewed and updated today:  Past Medical History:   Diagnosis Date   • Anxiety     Dx    • Bipolar affective (HCC)    • Depression     Dx in    • Heart murmur     Dx as a child,  Pt states no longer having it.   • Hypertension    • Pregnancy with nephrolithiasis 2012       Past Surgical History:   Procedure Laterality Date   • REPEAT C SECTION  2014    Performed by Stephani Swanson M[facility]D. [facility]   • LUMPECTOMY  2008    reduction   • RHINOPLASTY     • CRANIOTOMY      open cranium at 3 months old       [unfilled]    Allergies:   Allergies   Allergen Reactions   • Pcn [Penicillins] Hives and Rash       Social History     Socioeconomic History   • Marital status: Single     Spouse name: Not on file   • Number of children: Not on file   • Years of education: Not on file   • Highest education level: Not on file   Occupational History   • Not on file   Social Needs   • Financial resource strain: Not on file   • Food insecurity     Worry: Not on file     Inability: Not on file   • Transportation needs     Medical: Not on file     Non-medical: Not on file   Tobacco Use   • Smoking status: Never Smoker   • Smokeless tobacco: Never Used   Substance and Sexual Activity   • Alcohol use: No   • Drug use: No   • Sexual activity: Yes     Partners: Male    Lifestyle   • Physical activity     Days per week: Not on file     Minutes per session: Not on file   • Stress: Not on file   Relationships   • Social connections     Talks on phone: Not on file     Gets together: Not on file     Attends Jehovah's witness service: Not on file     Active member of club or organization: Not on file     Attends meetings of clubs or organizations: Not on file     Relationship status: Not on file   • Intimate partner violence     Fear of current or ex partner: Not on file     Emotionally abused: Not on file     Physically abused: Not on file     Forced sexual activity: Not on file   Other Topics Concern   • Not on file   Social History Narrative    ** Merged History Encounter **         ** Merged History Encounter **            Family History   Problem Relation Age of Onset   • Diabetes Mother    • Thyroid Mother    • Depression Father    • Heart Disease Father    • Diabetes Father    • Heart Disease Maternal Grandfather    • Diabetes Maternal Grandfather        Physical exam:  /84   Wt 80.5 kg (177 lb 8 oz)     GENERAL APPEARANCE: healthy, alert, no distress  NECK nontender, no masses, thyromegaly or nodules  HEART RRR with normal S1 and S2 ,no murmurs, no gallops, no peripheral edema  LUNG clear to auscultation, normal respiratory effort  ABDOMEN Abdomen soft, non-tender. BS normal. No masses,  No organomegaly  EXTREMITIES:negative clubbing, cyanosis, edema.  Palpation of the left upper extremity does not show any evidence of underlying Nexplanon.  NEURO Awake, alert and oriented x 3, Normal gait, no sensory deficits  SKIN No rashes, or ulcers or lesions seen  PSYCHIATRIC: Patient shows appropriate affect, is alert and oriented x3, intact judgment and insight.        1. Sterilization consult         Lindsey Gomes is a 34 y.o.  female  who presents for surgical consultation for a laparoscopic bilateral salpingectomy and Nexplanon removal.    Will order x-ray of left upper  extremity to assess location of Nexplanon.  Nexplanon was not palpated during exam today    Specific risks include but are not limited to pain, infection, bleeding, blood transfusion, pelvic pain, pelvic scarring, pain with intercourse, DVT/pulmonary Restorationism, damage to bowel, bladder or ureters, need for laparotomy and anesthesia risks.    After discussion of risks and benefits, all questions regarding procedure were answered for patient. Consents were signed.    Needs to be NPO after midnight day of surgery. Clean abdomen and umbilicus w/ antibacterial soap morning of surgery.    Postoperative course discussed with patient. Patient should return to office or emergency room for #1 fever greater than 101, #2 heavy vaginal bleeding soaking greater than one pad per hour, #3 uncontrolled pain, #4 inability to tolerate regular diet pass gas or moved bowels.    Follow up in 2 weeks after surgery for postop check.     Spent 30 mins with the patient with over 50% of the time discussing the procedure, risk and benefits and obtaining consent.

## 2020-05-21 ENCOUNTER — GYNECOLOGY VISIT (OUTPATIENT)
Dept: OBGYN | Facility: CLINIC | Age: 35
End: 2020-05-21
Payer: MEDICAID

## 2020-05-21 VITALS
SYSTOLIC BLOOD PRESSURE: 124 MMHG | BODY MASS INDEX: 36.25 KG/M2 | WEIGHT: 179.8 LBS | HEIGHT: 59 IN | DIASTOLIC BLOOD PRESSURE: 80 MMHG

## 2020-05-21 DIAGNOSIS — Z30.09 STERILIZATION CONSULT: ICD-10-CM

## 2020-05-21 RX ORDER — SERTRALINE HYDROCHLORIDE 100 MG/1
50 TABLET, FILM COATED ORAL EVERY MORNING
COMMUNITY
End: 2021-08-29

## 2020-05-21 ASSESSMENT — FIBROSIS 4 INDEX: FIB4 SCORE: 1.19

## 2020-05-21 NOTE — NON-PROVIDER
Patient here for a Pre OP  Last seen on 3/10/2020  pharmacy verified.  Patient phone #: [phone]  Last pap 02/14/2020 HANDY

## 2020-05-21 NOTE — PROGRESS NOTES
History and Physical Exam    CC: Preop exam    History of present illness: 34 y.o. presents for preoperative visit prior to sterilization.  Risks, benefits and alternative therapies have been discussed and patient still desires sterilization.    Patient also reports she has a Nexplanon in her left upper arm for the last 3 to 4 years.  She would also like to have it removed at that time.  Attempts to palpated in clinic have been unsuccessful.    Review of systems:  Pertinent positives documented in HPI and all other systems reviewed & are negative    POBHx:  3 para 1-2-0-3    PGYNHx: None    All PMH, PSH, allergies, social history and FH reviewed and updated today:  Past Medical History:   Diagnosis Date   • Anxiety     Dx    • Bipolar affective (HCC)    • Depression     Dx in    • Heart murmur     Dx as a child,  Pt states no longer having it.   • Hypertension    • Pregnancy with nephrolithiasis 2012       Past Surgical History:   Procedure Laterality Date   • REPEAT C SECTION  2014    Performed by Stephani Swanson M.D. at [facility]   • LUMPECTOMY  2008    reduction   • RHINOPLASTY     • CRANIOTOMY      open cranium at 3 months old       Allergies:   Allergies   Allergen Reactions   • Pcn [Penicillins] Hives and Rash       Social History     Socioeconomic History   • Marital status: Single     Spouse name: Not on file   • Number of children: Not on file   • Years of education: Not on file   • Highest education level: Not on file   Occupational History   • Not on file   Social Needs   • Financial resource strain: Not on file   • Food insecurity     Worry: Not on file     Inability: Not on file   • Transportation needs     Medical: Not on file     Non-medical: Not on file   Tobacco Use   • Smoking status: Never Smoker   • Smokeless tobacco: Never Used   Substance and Sexual Activity   • Alcohol use: No   • Drug use: Yes     Types: Marijuana     Comment: HAs stopped    •  "Sexual activity: Yes     Partners: Male   Lifestyle   • Physical activity     Days per week: Not on file     Minutes per session: Not on file   • Stress: Not on file   Relationships   • Social connections     Talks on phone: Not on file     Gets together: Not on file     Attends Yazidi service: Not on file     Active member of club or organization: Not on file     Attends meetings of clubs or organizations: Not on file     Relationship status: Not on file   • Intimate partner violence     Fear of current or ex partner: Not on file     Emotionally abused: Not on file     Physically abused: Not on file     Forced sexual activity: Not on file   Other Topics Concern   • Not on file   Social History Narrative    ** Merged History Encounter **         ** Merged History Encounter **            Family History   Problem Relation Age of Onset   • Thyroid Mother    • Depression Father    • Heart Disease Father    • Diabetes Father    • Heart Disease Maternal Grandfather    • Diabetes Maternal Grandfather        Physical exam:  /80 (BP Location: Right arm, Patient Position: Sitting, BP Cuff Size: Adult)   Ht 1.499 m (4' 11\")   Wt 81.6 kg (179 lb 12.8 oz)     General:appears stated age, is in no apparent distress, is well developed and well nourished  Head: normocephalic, non-tender  Neck: neck is supple  CV : regular rate and rhythm, no peripheral edema  Lungs: Normal respiratory effort. Clear to auscultation bilaterally  Abdomen: Bowel sounds positive, nondistended, soft, nontender x4, no rebound or guarding. No organomegaly. No masses.  Female GYN: Deferred   Skin: No rashes, or ulcers or lesions seen  Psychiatric: Patient shows appropriate affect, is alert and oriented x3, intact judgment and insight.      Assessment:  Desires sterilization    Plan:    Lindsey Gomes is a 34 y.o.  female  who presents for surgical consultation for a laparoscopic bilateral salpingectomy and Nexplanon removal.     Patient " was asked at last visit to obtain an x-ray of the left upper extremity to assess location of Nexplanon.  This has not been obtained as of this time.  I will reorder the  x-ray of left upper extremity to assess location of Nexplanon.  Nexplanon was not palpated during exam today     Specific risks include but are not limited to pain, infection, bleeding, blood transfusion, pelvic pain, pelvic scarring, pain with intercourse, DVT/pulmonary embolism, damage to bowel, bladder or ureters, need for laparotomy and anesthesia risks.     After discussion of risks and benefits, all questions regarding procedure were answered for patient. Consents were signed.     Needs to be NPO after midnight day of surgery. Clean abdomen and umbilicus w/ antibacterial soap morning of surgery.     Postoperative course discussed with patient. Patient should return to office or emergency room for #1 fever greater than 101, #2 heavy vaginal bleeding soaking greater than one pad per hour, #3 uncontrolled pain, #4 inability to tolerate regular diet pass gas or moved bowels.     Follow up in 2 weeks after surgery for postop check.      Spent 30 mins with the patient with over 50% of the time discussing the procedure, risk and benefits and obtaining consent.

## 2020-05-22 ENCOUNTER — OFFICE VISIT (OUTPATIENT)
Dept: ADMISSIONS | Facility: MEDICAL CENTER | Age: 35
End: 2020-05-22
Attending: OBSTETRICS & GYNECOLOGY
Payer: MEDICAID

## 2020-05-22 DIAGNOSIS — Z01.812 PRE-OPERATIVE LABORATORY EXAMINATION: ICD-10-CM

## 2020-05-22 LAB — COVID ORDER STATUS COVID19: NORMAL

## 2020-05-22 PROCEDURE — C9803 HOPD COVID-19 SPEC COLLECT: HCPCS

## 2020-05-23 ENCOUNTER — APPOINTMENT (OUTPATIENT)
Dept: RADIOLOGY | Facility: MEDICAL CENTER | Age: 35
End: 2020-05-23
Attending: OBSTETRICS & GYNECOLOGY
Payer: MEDICAID

## 2020-05-23 DIAGNOSIS — Z97.5 NEXPLANON IN PLACE: ICD-10-CM

## 2020-05-23 LAB
SARS-COV-2 RNA RESP QL NAA+PROBE: NOT DETECTED
SPECIMEN SOURCE: NORMAL

## 2020-05-23 PROCEDURE — 73060 X-RAY EXAM OF HUMERUS: CPT | Mod: LT

## 2020-05-26 DIAGNOSIS — Z01.810 PRE-OPERATIVE CARDIOVASCULAR EXAMINATION: ICD-10-CM

## 2020-05-26 DIAGNOSIS — Z01.812 PRE-OPERATIVE LABORATORY EXAMINATION: ICD-10-CM

## 2020-05-26 LAB
ANION GAP SERPL CALC-SCNC: 11 MMOL/L (ref 7–16)
BUN SERPL-MCNC: 13 MG/DL (ref 8–22)
CALCIUM SERPL-MCNC: 9.6 MG/DL (ref 8.5–10.5)
CHLORIDE SERPL-SCNC: 100 MMOL/L (ref 96–112)
CO2 SERPL-SCNC: 26 MMOL/L (ref 20–33)
CREAT SERPL-MCNC: 0.81 MG/DL (ref 0.5–1.4)
EKG IMPRESSION: NORMAL
ERYTHROCYTE [DISTWIDTH] IN BLOOD BY AUTOMATED COUNT: 40.4 FL (ref 35.9–50)
GLUCOSE SERPL-MCNC: 114 MG/DL (ref 65–99)
HCG SERPL QL: NEGATIVE
HCT VFR BLD AUTO: 43.2 % (ref 37–47)
HGB BLD-MCNC: 14.2 G/DL (ref 12–16)
MCH RBC QN AUTO: 30.4 PG (ref 27–33)
MCHC RBC AUTO-ENTMCNC: 32.9 G/DL (ref 33.6–35)
MCV RBC AUTO: 92.5 FL (ref 81.4–97.8)
PLATELET # BLD AUTO: 188 K/UL (ref 164–446)
PMV BLD AUTO: 10.9 FL (ref 9–12.9)
POTASSIUM SERPL-SCNC: 4.5 MMOL/L (ref 3.6–5.5)
RBC # BLD AUTO: 4.67 M/UL (ref 4.2–5.4)
SODIUM SERPL-SCNC: 137 MMOL/L (ref 135–145)
WBC # BLD AUTO: 6.6 K/UL (ref 4.8–10.8)

## 2020-05-26 PROCEDURE — 93010 ELECTROCARDIOGRAM REPORT: CPT | Performed by: INTERNAL MEDICINE

## 2020-05-26 PROCEDURE — 36415 COLL VENOUS BLD VENIPUNCTURE: CPT

## 2020-05-26 PROCEDURE — 84703 CHORIONIC GONADOTROPIN ASSAY: CPT

## 2020-05-26 PROCEDURE — 93005 ELECTROCARDIOGRAM TRACING: CPT

## 2020-05-26 PROCEDURE — 85027 COMPLETE CBC AUTOMATED: CPT

## 2020-05-26 PROCEDURE — 80048 BASIC METABOLIC PNL TOTAL CA: CPT

## 2020-05-26 RX ORDER — COVID-19 ANTIGEN TEST
1-2 KIT MISCELLANEOUS PRN
COMMUNITY
End: 2022-02-19

## 2020-05-26 ASSESSMENT — FIBROSIS 4 INDEX: FIB4 SCORE: 1.19

## 2020-05-26 NOTE — PROGRESS NOTES
COVID-19 Pre-surgery screenin. Do you have an undiagnosed respiratory illness or symptoms such as coughing or sneezing? no (Yes/No)  a. Onset of Sx no  b. Acute vs. chronic respiratory illness no    2. Do you have an unexplained fever greater than 100.4 degrees Fahrenheit or 38 degrees Celsius?     no (Yes/No)    3. Have you had direct exposure to a patient who tested positive for Covid-19?    no (Yes/No)    4. Have you traveled outside [facility] in the last 14 days?    5. Have you had any lost of taste, smell, N/V or sore throat?    Patient has been informed of no visitor policy and asked to wear a mask upon entering the hospital    YES

## 2020-05-27 ENCOUNTER — ANESTHESIA (OUTPATIENT)
Dept: SURGERY | Facility: MEDICAL CENTER | Age: 35
End: 2020-05-27
Payer: MEDICAID

## 2020-05-27 ENCOUNTER — ANESTHESIA EVENT (OUTPATIENT)
Dept: SURGERY | Facility: MEDICAL CENTER | Age: 35
End: 2020-05-27
Payer: MEDICAID

## 2020-05-27 ENCOUNTER — HOSPITAL ENCOUNTER (OUTPATIENT)
Facility: MEDICAL CENTER | Age: 35
End: 2020-05-27
Attending: OBSTETRICS & GYNECOLOGY | Admitting: OBSTETRICS & GYNECOLOGY
Payer: MEDICAID

## 2020-05-27 VITALS
TEMPERATURE: 97 F | WEIGHT: 181.66 LBS | HEIGHT: 59 IN | HEART RATE: 84 BPM | BODY MASS INDEX: 36.62 KG/M2 | DIASTOLIC BLOOD PRESSURE: 94 MMHG | RESPIRATION RATE: 5 BRPM | SYSTOLIC BLOOD PRESSURE: 138 MMHG | OXYGEN SATURATION: 90 %

## 2020-05-27 DIAGNOSIS — Z41.9 SURGERY, ELECTIVE: ICD-10-CM

## 2020-05-27 LAB — PATHOLOGY CONSULT NOTE: NORMAL

## 2020-05-27 PROCEDURE — A9270 NON-COVERED ITEM OR SERVICE: HCPCS | Performed by: OBSTETRICS & GYNECOLOGY

## 2020-05-27 PROCEDURE — 160035 HCHG PACU - 1ST 60 MINS PHASE I: Performed by: OBSTETRICS & GYNECOLOGY

## 2020-05-27 PROCEDURE — 500002 HCHG ADHESIVE, DERMABOND: Performed by: OBSTETRICS & GYNECOLOGY

## 2020-05-27 PROCEDURE — 700105 HCHG RX REV CODE 258: Performed by: OBSTETRICS & GYNECOLOGY

## 2020-05-27 PROCEDURE — 700101 HCHG RX REV CODE 250: Performed by: ANESTHESIOLOGY

## 2020-05-27 PROCEDURE — 501581 HCHG TROCAR: Performed by: OBSTETRICS & GYNECOLOGY

## 2020-05-27 PROCEDURE — 700102 HCHG RX REV CODE 250 W/ 637 OVERRIDE(OP): Performed by: OBSTETRICS & GYNECOLOGY

## 2020-05-27 PROCEDURE — 88302 TISSUE EXAM BY PATHOLOGIST: CPT

## 2020-05-27 PROCEDURE — 11982 REMOVE DRUG IMPLANT DEVICE: CPT | Performed by: OBSTETRICS & GYNECOLOGY

## 2020-05-27 PROCEDURE — 160009 HCHG ANES TIME/MIN: Performed by: OBSTETRICS & GYNECOLOGY

## 2020-05-27 PROCEDURE — 160025 RECOVERY II MINUTES (STATS): Performed by: OBSTETRICS & GYNECOLOGY

## 2020-05-27 PROCEDURE — 500868 HCHG NEEDLE, SURGI(VARES): Performed by: OBSTETRICS & GYNECOLOGY

## 2020-05-27 PROCEDURE — 700111 HCHG RX REV CODE 636 W/ 250 OVERRIDE (IP): Performed by: ANESTHESIOLOGY

## 2020-05-27 PROCEDURE — 160028 HCHG SURGERY MINUTES - 1ST 30 MINS LEVEL 3: Performed by: OBSTETRICS & GYNECOLOGY

## 2020-05-27 PROCEDURE — 500886 HCHG PACK, LAPAROSCOPY: Performed by: OBSTETRICS & GYNECOLOGY

## 2020-05-27 PROCEDURE — 160002 HCHG RECOVERY MINUTES (STAT): Performed by: OBSTETRICS & GYNECOLOGY

## 2020-05-27 PROCEDURE — 58661 LAPAROSCOPY REMOVE ADNEXA: CPT | Mod: 50 | Performed by: OBSTETRICS & GYNECOLOGY

## 2020-05-27 PROCEDURE — 160036 HCHG PACU - EA ADDL 30 MINS PHASE I: Performed by: OBSTETRICS & GYNECOLOGY

## 2020-05-27 PROCEDURE — 160039 HCHG SURGERY MINUTES - EA ADDL 1 MIN LEVEL 3: Performed by: OBSTETRICS & GYNECOLOGY

## 2020-05-27 PROCEDURE — 160047 HCHG PACU  - EA ADDL 30 MINS PHASE II: Performed by: OBSTETRICS & GYNECOLOGY

## 2020-05-27 PROCEDURE — 501838 HCHG SUTURE GENERAL: Performed by: OBSTETRICS & GYNECOLOGY

## 2020-05-27 PROCEDURE — 160048 HCHG OR STATISTICAL LEVEL 1-5: Performed by: OBSTETRICS & GYNECOLOGY

## 2020-05-27 PROCEDURE — 160046 HCHG PACU - 1ST 60 MINS PHASE II: Performed by: OBSTETRICS & GYNECOLOGY

## 2020-05-27 PROCEDURE — 502703 HCHG DEVICE, LIGASURE V SEALER: Performed by: OBSTETRICS & GYNECOLOGY

## 2020-05-27 PROCEDURE — 700101 HCHG RX REV CODE 250: Performed by: OBSTETRICS & GYNECOLOGY

## 2020-05-27 RX ORDER — OXYCODONE HYDROCHLORIDE AND ACETAMINOPHEN 5; 325 MG/1; MG/1
1 TABLET ORAL EVERY 4 HOURS PRN
Qty: 15 TAB | Refills: 0 | Status: SHIPPED | OUTPATIENT
Start: 2020-05-27 | End: 2020-06-03

## 2020-05-27 RX ORDER — CEFOTETAN DISODIUM 2 G/20ML
INJECTION, POWDER, FOR SOLUTION INTRAMUSCULAR; INTRAVENOUS PRN
Status: DISCONTINUED | OUTPATIENT
Start: 2020-05-27 | End: 2020-05-27 | Stop reason: SURG

## 2020-05-27 RX ORDER — MEPERIDINE HYDROCHLORIDE 25 MG/ML
12.5 INJECTION INTRAMUSCULAR; INTRAVENOUS; SUBCUTANEOUS
Status: DISCONTINUED | OUTPATIENT
Start: 2020-05-27 | End: 2020-05-27 | Stop reason: HOSPADM

## 2020-05-27 RX ORDER — SODIUM CHLORIDE, SODIUM LACTATE, POTASSIUM CHLORIDE, CALCIUM CHLORIDE 600; 310; 30; 20 MG/100ML; MG/100ML; MG/100ML; MG/100ML
INJECTION, SOLUTION INTRAVENOUS CONTINUOUS
Status: DISCONTINUED | OUTPATIENT
Start: 2020-05-27 | End: 2020-05-27 | Stop reason: HOSPADM

## 2020-05-27 RX ORDER — ONDANSETRON 2 MG/ML
INJECTION INTRAMUSCULAR; INTRAVENOUS PRN
Status: DISCONTINUED | OUTPATIENT
Start: 2020-05-27 | End: 2020-05-27 | Stop reason: SURG

## 2020-05-27 RX ORDER — BUPIVACAINE HYDROCHLORIDE AND EPINEPHRINE 2.5; 5 MG/ML; UG/ML
INJECTION, SOLUTION EPIDURAL; INFILTRATION; INTRACAUDAL; PERINEURAL
Status: DISCONTINUED | OUTPATIENT
Start: 2020-05-27 | End: 2020-05-27 | Stop reason: HOSPADM

## 2020-05-27 RX ORDER — BUPIVACAINE HYDROCHLORIDE AND EPINEPHRINE 2.5; 5 MG/ML; UG/ML
INJECTION, SOLUTION EPIDURAL; INFILTRATION; INTRACAUDAL; PERINEURAL
Status: DISCONTINUED
Start: 2020-05-27 | End: 2020-05-27 | Stop reason: HOSPADM

## 2020-05-27 RX ORDER — DEXAMETHASONE SODIUM PHOSPHATE 4 MG/ML
INJECTION, SOLUTION INTRA-ARTICULAR; INTRALESIONAL; INTRAMUSCULAR; INTRAVENOUS; SOFT TISSUE PRN
Status: DISCONTINUED | OUTPATIENT
Start: 2020-05-27 | End: 2020-05-27 | Stop reason: SURG

## 2020-05-27 RX ORDER — KETOROLAC TROMETHAMINE 30 MG/ML
INJECTION, SOLUTION INTRAMUSCULAR; INTRAVENOUS PRN
Status: DISCONTINUED | OUTPATIENT
Start: 2020-05-27 | End: 2020-05-27 | Stop reason: SURG

## 2020-05-27 RX ORDER — ONDANSETRON 2 MG/ML
4 INJECTION INTRAMUSCULAR; INTRAVENOUS
Status: COMPLETED | OUTPATIENT
Start: 2020-05-27 | End: 2020-05-27

## 2020-05-27 RX ORDER — HALOPERIDOL 5 MG/ML
1 INJECTION INTRAMUSCULAR
Status: DISCONTINUED | OUTPATIENT
Start: 2020-05-27 | End: 2020-05-27 | Stop reason: HOSPADM

## 2020-05-27 RX ORDER — DIPHENHYDRAMINE HYDROCHLORIDE 50 MG/ML
12.5 INJECTION INTRAMUSCULAR; INTRAVENOUS
Status: DISCONTINUED | OUTPATIENT
Start: 2020-05-27 | End: 2020-05-27 | Stop reason: HOSPADM

## 2020-05-27 RX ADMIN — Medication 100 MG: at 09:12

## 2020-05-27 RX ADMIN — FENTANYL CITRATE 50 MCG: 50 INJECTION INTRAMUSCULAR; INTRAVENOUS at 09:52

## 2020-05-27 RX ADMIN — FENTANYL CITRATE 100 MCG: 50 INJECTION INTRAMUSCULAR; INTRAVENOUS at 09:06

## 2020-05-27 RX ADMIN — SODIUM CHLORIDE, POTASSIUM CHLORIDE, SODIUM LACTATE AND CALCIUM CHLORIDE: 600; 310; 30; 20 INJECTION, SOLUTION INTRAVENOUS at 09:06

## 2020-05-27 RX ADMIN — ONDANSETRON 4 MG: 2 INJECTION INTRAMUSCULAR; INTRAVENOUS at 11:25

## 2020-05-27 RX ADMIN — HALOPERIDOL LACTATE 1 MG: 5 INJECTION, SOLUTION INTRAMUSCULAR at 12:34

## 2020-05-27 RX ADMIN — ONDANSETRON 4 MG: 2 INJECTION INTRAMUSCULAR; INTRAVENOUS at 09:31

## 2020-05-27 RX ADMIN — ROCURONIUM BROMIDE 50 MG: 10 INJECTION, SOLUTION INTRAVENOUS at 09:15

## 2020-05-27 RX ADMIN — CEFOTETAN DISODIUM 2 G: 2 INJECTION, POWDER, FOR SOLUTION INTRAMUSCULAR; INTRAVENOUS at 09:06

## 2020-05-27 RX ADMIN — POVIDONE-IODINE 15 ML: 10 SOLUTION TOPICAL at 08:25

## 2020-05-27 RX ADMIN — KETOROLAC TROMETHAMINE 30 MG: 30 INJECTION, SOLUTION INTRAMUSCULAR at 09:51

## 2020-05-27 RX ADMIN — SODIUM CHLORIDE, POTASSIUM CHLORIDE, SODIUM LACTATE AND CALCIUM CHLORIDE: 600; 310; 30; 20 INJECTION, SOLUTION INTRAVENOUS at 08:25

## 2020-05-27 RX ADMIN — PROPOFOL 200 MG: 10 INJECTION, EMULSION INTRAVENOUS at 09:10

## 2020-05-27 RX ADMIN — SUGAMMADEX 400 MG: 100 INJECTION, SOLUTION INTRAVENOUS at 09:51

## 2020-05-27 RX ADMIN — FENTANYL CITRATE 100 MCG: 50 INJECTION INTRAMUSCULAR; INTRAVENOUS at 09:49

## 2020-05-27 RX ADMIN — DEXAMETHASONE SODIUM PHOSPHATE 4 MG: 4 INJECTION, SOLUTION INTRA-ARTICULAR; INTRALESIONAL; INTRAMUSCULAR; INTRAVENOUS; SOFT TISSUE at 09:31

## 2020-05-27 ASSESSMENT — PAIN SCALES - GENERAL: PAIN_LEVEL: 1

## 2020-05-27 ASSESSMENT — FIBROSIS 4 INDEX: FIB4 SCORE: 0.83

## 2020-05-27 NOTE — DISCHARGE INSTRUCTIONS
ACTIVITY: Rest and take it easy for the first 24 hours.  A responsible adult is recommended to remain with you during that time.  It is normal to feel sleepy.  We encourage you to not do anything that requires balance, judgment or coordination.    MILD FLU-LIKE SYMPTOMS ARE NORMAL. YOU MAY EXPERIENCE GENERALIZED MUSCLE ACHES, THROAT IRRITATION, HEADACHE AND/OR SOME NAUSEA.    FOR 24 HOURS DO NOT:  Drive, operate machinery or run household appliances.  Drink beer or alcoholic beverages.   Make important decisions or sign legal documents.    SPECIAL INSTRUCTIONS:   Follow-up in 2 weeks     Call or return to clinic if:   Pain uncontrolled by pain medications   Heavy vaginal bleeding   Temperature greater than 100.4   Or if you have any other questions or concerns       DIET: To avoid nausea, slowly advance diet as tolerated, avoiding spicy or greasy foods for the first day.  Add more substantial food to your diet according to your physician's instructions.  Babies can be fed formula or breast milk as soon as they are hungry.  INCREASE FLUIDS AND FIBER TO AVOID CONSTIPATION.    SURGICAL DRESSING/BATHING: ok to shower starting tomorrow. Avoid tub baths or hot tubs until follow up appointmetn.    FOLLOW-UP APPOINTMENT:  A follow-up appointment should be arranged with your doctor in 2 weeks; call to schedule.    You should CALL YOUR PHYSICIAN if you develop:  Fever greater than 101 degrees F.  Pain not relieved by medication, or persistent nausea or vomiting.  Excessive bleeding (blood soaking through dressing) or unexpected drainage from the wound.  Extreme redness or swelling around the incision site, drainage of pus or foul smelling drainage.  Inability to urinate or empty your bladder within 8 hours.  Problems with breathing or chest pain.    You should call 911 if you develop problems with breathing or chest pain.  If you are unable to contact your doctor or surgical center, you should go to the nearest emergency  room or urgent care center.  Physician's telephone #: [phone]    If any questions arise, call your doctor.  If your doctor is not available, please feel free to call the Surgical Center at [phone].  The Center is open Monday through Friday from 7AM to 7PM.  You can also call the HEALTH HOTLINE open 24 hours/day, 7 days/week and speak to a nurse at [phone], or toll free at [phone].    A registered nurse may call you a few days after your surgery to see how you are doing after your procedure.    MEDICATIONS: Resume taking daily medication.  Take prescribed pain medication with food.  If no medication is prescribed, you may take non-aspirin pain medication if needed.  PAIN MEDICATION CAN BE VERY CONSTIPATING.  Take a stool softener or laxative such as senokot, pericolace, or milk of magnesia if needed.    Prescription given for Percocet.  Last pain medication given at _________    If your physician has prescribed pain medication that includes Acetaminophen (Tylenol), do not take additional Acetaminophen (Tylenol) while taking the prescribed medication.    Depression / Suicide Risk    As you are discharged from this [facility] facility, it is important to learn how to keep safe from harming yourself.    Recognize the warning signs:  · Abrupt changes in personality, positive or negative- including increase in energy   · Giving away possessions  · Change in eating patterns- significant weight changes-  positive or negative  · Change in sleeping patterns- unable to sleep or sleeping all the time   · Unwillingness or inability to communicate  · Depression  · Unusual sadness, discouragement and loneliness  · Talk of wanting to die  · Neglect of personal appearance   · Rebelliousness- reckless behavior  · Withdrawal from people/activities they love  · Confusion- inability to concentrate     If you or a loved one observes any of these behaviors or has concerns about self-harm, here's what you can  do:  · Talk about it- your feelings and reasons for harming yourself  · Remove any means that you might use to hurt yourself (examples: pills, rope, extension cords, firearm)  · Get professional help from the community (Mental Health, Substance Abuse, psychological counseling)  · Do not be alone:Call your Safe Contact- someone whom you trust who will be there for you.  · Call your local CRISIS HOTLINE 845-6681 or 168-484-0505  · Call your local Children's Mobile Crisis Response Team [place] (981) 622-8887 or www.[employer]  · Call the toll free National Suicide Prevention Hotlines   · National Suicide Prevention Lifeline 986-960-PFSB (7971)  · National Hope Line Network 800-SUICIDE (469-7684)

## 2020-05-27 NOTE — ANESTHESIA POSTPROCEDURE EVALUATION
Patient: Lindsey Gomes    Procedure Summary     Date:  05/27/20 Room / Location:  [facility] ROOM 25 / SURGERY SAME DAY [facility]    Anesthesia Start:  0906 Anesthesia Stop:  1018    Procedures:       PELVISCOPY (N/A Abdomen)      SALPINGECTOMY (Bilateral Abdomen)      REMOVAL, Nexplanon (Left Arm Upper) Diagnosis:  (PERMANENT STERILIZATION)    Surgeon:  Hussein Gordon M.D. Responsible Provider:  Edwardo Mcguire M.D.    Anesthesia Type:  general ASA Status:  3          Final Anesthesia Type: general  Last vitals  BP   Blood Pressure: (!) 92/52    Temp   36.1 °C (97 °F)    Pulse   Pulse: 65   Resp   20    SpO2   93 %      Anesthesia Post Evaluation    Patient location during evaluation: PACU  Patient participation: complete - patient participated  Level of consciousness: awake and alert  Pain score: 1    Airway patency: patent  Anesthetic complications: no  Cardiovascular status: adequate and hemodynamically stable  Respiratory status: acceptable  Hydration status: acceptable    PONV: none           Nurse Pain Score: 0 (NPRS)

## 2020-05-27 NOTE — OP REPORT
DATE OF SERVICE: May 27, 2020    PREOPERATIVE DIAGNOSIS:  Multiparous female desires permanent sterilization.  Desires Nexplanon removal    POSTOPERATIVE DIAGNOSIS:  Multiparous female desires permanent sterilization.  Desires Nexplanon removal    PROCEDURE PERFORMED:  Laparoscopic bilateral salpingectomy.  Removal of deeply embedded Nexplanon    SURGEON: Hussein Gordon MD.    ASSISTANT:  None.    ANESTHESIA:  General endotracheal anesthesia.    ANESTHESIOLOGIST:  Edwardo Mcguire MD    ESTIMATED BLOOD LOSS:  10 mL    FINDINGS:  Normal uterus, tubes, and ovaries bilaterally.  Deeply embedded Nexplanon in left upper extremity    COMPLICATIONS:  None.    PROCEDURE:  The patient was taken to the operating room where general endotracheal anesthesia was applied.  The patient was placed in the dorsal lithotomy position with Ken stirrups. The patient was prepped and draped in the normal sterile fashion.  A ZUMI uterine manipulator was placed into the uterus    The procedure went towards the abdomen. Marcaine w/ epinephrine was injected into the umbilicus and an 5 mm incision was made at the umbilicus.  A 5 mm trochar was then placed over the laparoscope and placed into the abdominal cavity under direct laparoscopic visualization.  Pneumoperitoneum was established with CO2 gas to a pressure of 15 mmHg.  Intraabdominal placement was confirmed.       An additional 2 ports were then placed.  Both 5 mm in size.  One was placed in left lower quadrant and the other end of the right lower quadrant.  Both placed on direct visualization.  Evaluation of entry site showed no evidence of any injury.    The pelvis was evaluated.  No evidence of any injury was noted.  Bilateral normal tubes ovaries was noted.  Normal-appearing uterus.  The right fallopian tube was then grasped and elevated and using a LigaSure device the mesosalpinx was cauterized and transected until the level of the cornua was reached.  The tube was then transected  level of the cornua and removed.  The procedure was then repeated on the left side.  The left fallopian tube was grasped and elevated and the mesosalpinx cauterized and transected with the help of a LigaSure device.  The tube was then transected level of the cornua and removed.  Evaluation of surgical site showed no evidence of any bleeding.    Has the patient desire removal of a deeply embedded Nexplanon.  The left arm was then prepped and draped in a sterile fashion.  I was able to barely palpate the implant but his location was visualized.  A small incision was then made overlying the implant and using careful dissection the implant was visualized, grasped and removed.  Incision was then closed with Dermabond.    All instruments were removed from the abdomen.  The skin was reapproximated with 4-0 vicryl subcuticular stitch.  Dermabond skin glue was placed.  Sponge, needle, instrument, and lap counts were correct x2.  Patient tolerated the procedure well and went to recovery room in stable condition.       ____________________________________    Hussein Gordon MD

## 2020-05-27 NOTE — ANESTHESIA PREPROCEDURE EVALUATION
Relevant Problems      (+) Pregnancy with nephrolithiasis       Physical Exam    Airway   Mallampati: II  TM distance: >3 FB  Neck ROM: full       Cardiovascular - normal exam  Rhythm: regular  Rate: normal  (-) murmur     Dental - normal exam           Pulmonary - normal exam  Breath sounds clear to auscultation     Abdominal    Neurological - normal exam                 Anesthesia Plan    ASA 3       Plan - general       Airway plan will be ETT        Induction: intravenous    Postoperative Plan: Postoperative administration of opioids is intended.    Pertinent diagnostic labs and testing reviewed    Informed Consent:    Anesthetic plan and risks discussed with patient.    Use of blood products discussed with: patient whom consented to blood products.

## 2020-05-27 NOTE — OR NURSING
1246 - Pt arrival to Phase 2 via cart from OR, up to chair without difficulty. Pt attached to monitors, vital signs stable, pt on 2L O2 via NC. Report received from Sarah SRIVASTAVA.    1321 - pt placed on room air, maintaining oxygen saturation without difficulty.    1340 - discharge instructions given to mother over the phone. Pt to be discharged when ride home arrives.    1424 - pt discharged home from Phase 2 with responsible adult (mother).

## 2020-05-27 NOTE — ANESTHESIA PROCEDURE NOTES
Airway    Date/Time: 5/27/2020 9:14 AM  Performed by: Edwardo Mcguire M.D.  Authorized by: Edwardo Mcguire M.D.     Location:  OR  Urgency:  Elective  Indications for Airway Management:  Anesthesia      Spontaneous Ventilation: absent    Sedation Level:  Deep  Preoxygenated: Yes    Patient Position:  Sniffing  Final Airway Type:  Endotracheal airway  Final Endotracheal Airway:  ETT  Cuffed: Yes    Technique Used for Successful ETT Placement:  Direct laryngoscopy    Insertion Site:  Oral  Blade Type:  Rajput  Laryngoscope Blade/Videolaryngoscope Blade Size:  2  ETT Size (mm):  7.5  Measured from:  Teeth  ETT to Teeth (cm):  21  Placement Verified by: auscultation and capnometry    Cormack-Lehane Classification:  Grade I - full view of glottis  Number of Attempts at Approach:  1

## 2020-05-27 NOTE — ANESTHESIA QCDR
2019 [facility] Clinical Data Registry (for Quality Improvement)     Postoperative nausea/vomiting risk protocol (Adult = 18 yrs and Pediatric 3-17 yrs)- (430 and 463)  General inhalation anesthetic (NOT TIVA) with PONV risk factors: Yes  Provision of anti-emetic therapy with at least 2 different classes of agents: Yes   Patient DID NOT receive anti-emetic therapy and reason is documented in Medical Record:  N/A    Multimodal Pain Management- (477)  Non-emergent surgery AND patient age >= 18:   Use of Multimodal Pain Management, two or more drugs and/or interventions, NOT including systemic opioids:   Exception: Documented allergy to multiple classes of analgesics:     Smoking Abstinence (404)  Patient is current smoker (cigarette, pipe, e-cig, marijuanna):   Elective Surgery:   Abstinence instructions provided prior to day of surgery:   Patient abstained from smoking on day of surgery:     Pre-Op Beta-Blocker in Isolated CABG (44)  Isolated CABG AND patient age >= 18:   Beta-blocker admin within 24 hours of surgical incision:   Exception:of medical reason(s) for not administering beta blocker within 24 hours prior to surgical incision (e.g., not  indicated,other medical reason):     PACU assessment of acute postoperative pain prior to Anesthesia Care End- Applies to Patients Age = 18- (ABG7)  Initial PACU pain score is which of the following: < 7/10  Patient unable to report pain score: N/A    Post-anesthetic transfer of care checklist/protocol to PACU/ICU- (426 and 427)  Upon conclusion of case, patient transferred to which of the following locations: PACU/Non-ICU  Use of transfer checklist/protocol: Yes  Exclusion: Service Performed in Patient Hospital Room (and thus did not require transfer): N/A  Unplanned admission to ICU related to anesthesia service up through end of PACU care- (MD51)  Unplanned admission to ICU (not initially anticipated at anesthesia start time): No

## 2020-05-27 NOTE — OR NURSING
"1016-Received from OR via [facility]. S/P-pelviscopy, lap bilat. Salpingectomy and removal of nexplanon. Two lap stabs with steri strips.Oral airway in place.  Bedside report received from RN. And Anesthesiologist.    1030-Resting quietly. Denies pain.    1100-no change resting quietly. vss per baseline.    1125-nauseous, zofran given. desats on room air.    1200-spoke with  on the phone. Updated with discharge plan. Remains nauseous. Sipping on water and eating saltine cracker. Hob elevated. Cool wash cloth to forehead.    1230-no change in status. resting but remains nauseous. States she \"just wants to go home\". Denies pain.    1236-haldol 1mg given for nausea.     1245-report off to Tu José. Trans.to pacu 2 via edgar.          "

## 2020-05-27 NOTE — OR SURGEON
Immediate Post OP Note    PreOp Diagnosis: Desires sterilization    PostOp Diagnosis: Same    Procedure(s):  PELVISCOPY - Wound Class: Clean  SALPINGECTOMY - Wound Class: Clean  REMOVAL, Nexplanon - Wound Class: Clean    Surgeon(s):  PANTERA Ford D.O.    Anesthesiologist/Type of Anesthesia:  Anesthesiologist: Edwardo Mcguire M.D./General    Surgical Staff:  Circulator: Rosa Orosco R.N.  Scrub Person: Sara Hansen; Yvette Barrett    Specimens removed if any:  ID Type Source Tests Collected by Time Destination   A : bilateral falopian tubes Other Other PATHOLOGY SPECIMEN Hussein Gordon M.D. 5/27/2020 10:03 AM        Estimated Blood Loss: 10 mL    Findings: Bilateral normal tubes and ovaries.  Normal uterus.  No evidence of pelvic pathology.  Deeply embedded Nexplanon in the left upper extremity    Complications: None        5/27/2020 10:28 AM Hussein Gordon M.D.

## 2020-05-27 NOTE — ANESTHESIA TIME REPORT
Anesthesia Start and Stop Event Times     Date Time Event    5/27/2020 0846 Ready for Procedure     0906 Anesthesia Start     1018 Anesthesia Stop        Responsible Staff  05/27/20    Name Role Begin End    Edwardo Mcguire M.D. Anesth 0906 1018        Preop Diagnosis (Free Text):  Pre-op Diagnosis     PERMANENT STERILIZATION        Preop Diagnosis (Codes):    Post op Diagnosis  Sterilization      Premium Reason  Non-Premium    Comments:                                                                  Laparoscopy and salpingectomy, removal of birth control device left arm

## 2020-06-02 ENCOUNTER — GYNECOLOGY VISIT (OUTPATIENT)
Dept: OBGYN | Facility: CLINIC | Age: 35
End: 2020-06-02
Payer: MEDICAID

## 2020-06-02 VITALS — WEIGHT: 181 LBS | SYSTOLIC BLOOD PRESSURE: 132 MMHG | DIASTOLIC BLOOD PRESSURE: 82 MMHG | BODY MASS INDEX: 36.56 KG/M2

## 2020-06-02 DIAGNOSIS — Z09 POSTOP CHECK: ICD-10-CM

## 2020-06-02 PROCEDURE — 99024 POSTOP FOLLOW-UP VISIT: CPT | Performed by: OBSTETRICS & GYNECOLOGY

## 2020-06-02 ASSESSMENT — FIBROSIS 4 INDEX: FIB4 SCORE: 0.83

## 2020-06-02 NOTE — NON-PROVIDER
Pt here for Post-op. BTL on 5/27/2020.    Pt states no complaints   Good# [phone]  Pharmacy confirmed.

## 2020-06-02 NOTE — PROGRESS NOTES
GYN Visit:    CC: postop check    HPI: 34 y.o.yo  s/p laparoscopic bilateral salpingectomy for sterilization and nexplanon removal on 2020 w/ Dr. Gordon      Pt reports doing well.  Minimal pain.  No, although asked about headache which she had yesterday.  Resolved with Tylenol, no history of headaches.  No associated numbness/weakness/visual changes.    ROS:   Gen: denies fevers, general concerns  Abd: denies abd pain, N/V, constipation  : denies vaginal bleeding, discharge    /82   Wt 82.1 kg (181 lb)   LMP  (LMP Unknown)   BMI 36.56 kg/m²   Gen: AAO, NAD  Abd: soft, NT, ND, incisions healing well  LUE with bandage in place over small incision.      FINAL DIAGNOSIS:   A. Bilateral fallopian tubes:          Complete cross sections of two fimbriated fallopian tubes           identified.     A/P:33 yo  s/p laparoscopic BS, nexplanon removal 2020    - pathology benign, reviewed as above  - no signs of postop complications  -Discussed with headache severe and not resolving or associated with numbness, weakness, other concerns to go to ED.  Frequent headaches recommend evaluation by PCP.      F/u: prn/annually    Lindsey Rosenbaum MD  [facility], Women's Health

## 2021-04-14 ENCOUNTER — HOSPITAL ENCOUNTER (EMERGENCY)
Facility: MEDICAL CENTER | Age: 36
End: 2021-04-14
Attending: EMERGENCY MEDICINE
Payer: MEDICAID

## 2021-04-14 VITALS
RESPIRATION RATE: 16 BRPM | DIASTOLIC BLOOD PRESSURE: 93 MMHG | WEIGHT: 180.78 LBS | TEMPERATURE: 96.8 F | SYSTOLIC BLOOD PRESSURE: 146 MMHG | HEART RATE: 66 BPM | HEIGHT: 59 IN | OXYGEN SATURATION: 95 % | BODY MASS INDEX: 36.44 KG/M2

## 2021-04-14 DIAGNOSIS — F60.3 BORDERLINE PERSONALITY DISORDER (HCC): ICD-10-CM

## 2021-04-14 DIAGNOSIS — R45.851 SUICIDAL IDEATION: ICD-10-CM

## 2021-04-14 LAB
AMPHET UR QL SCN: NEGATIVE
BARBITURATES UR QL SCN: NEGATIVE
BENZODIAZ UR QL SCN: NEGATIVE
BZE UR QL SCN: NEGATIVE
CANNABINOIDS UR QL SCN: NEGATIVE
HCG UR QL: NEGATIVE
METHADONE UR QL SCN: NEGATIVE
OPIATES UR QL SCN: NEGATIVE
OXYCODONE UR QL SCN: NEGATIVE
PCP UR QL SCN: NEGATIVE
POC BREATHALIZER: 0 PERCENT (ref 0–0.01)
PROPOXYPH UR QL SCN: NEGATIVE

## 2021-04-14 PROCEDURE — 302970 POC BREATHALIZER: Performed by: EMERGENCY MEDICINE

## 2021-04-14 PROCEDURE — 81025 URINE PREGNANCY TEST: CPT

## 2021-04-14 PROCEDURE — 90791 PSYCH DIAGNOSTIC EVALUATION: CPT

## 2021-04-14 PROCEDURE — 80307 DRUG TEST PRSMV CHEM ANLYZR: CPT

## 2021-04-14 PROCEDURE — 99284 EMERGENCY DEPT VISIT MOD MDM: CPT

## 2021-04-14 ASSESSMENT — FIBROSIS 4 INDEX: FIB4 SCORE: 0.86

## 2021-04-14 NOTE — DISCHARGE PLANNING
[facility]  Crisis/Safety Plan     Name:  Lindsey Gomes  MRN:  [id number]  Date:  2021     Warning signs that a crisis may be developing for me or I may be at risk:  1) my anger explodes  2)  Irritability esculates  3) thinking of harming self and actually doing it.     Coping strategies I can use on my own (relaxation, physical activity, etc):  1) meditation  2) taking walk  3) watching fun movie     Ways I can make my environment safe:  1) take walk before exploding  2) stop and breath  3) leave the home overnight     Things I want to tell myself when I feel a crisis developin) I desire to be better parent  2) I am a better cook  3) I have a good sense of humor     People I can contact for support or distraction (and their phone numbers):  1) Mom, Brianna Gomes [phone]  2)  Andrés  3)     If I’m not able to reach my support people, or the above strategies don’t help, I can contact the following professionals, agencies, or hotlines:  1) Crisis Call Center ():  [phone] -OR- [phone]  2) Crisis Text Line ():  Text START to 669363  3) Dr Richard  4) [facility]

## 2021-04-14 NOTE — ED NOTES
Pt has been cooperative with hospital procedures.  All belongings have been safely stowed.  Pt has a sitter providing direct observation.
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
## 2021-04-14 NOTE — ED TRIAGE NOTES
Chief Complaint   Patient presents with   • Suicidal Ideation     BIB [facility] from home.  Pt with hx of depression.  Tried to take a handful to zofot this am in suicide attempt.  Stopped by spouse.  Bob BELCHER placed her on legal 2000.
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
## 2021-08-29 ENCOUNTER — HOSPITAL ENCOUNTER (OUTPATIENT)
Facility: MEDICAL CENTER | Age: 36
End: 2021-08-29
Attending: FAMILY MEDICINE
Payer: MEDICAID

## 2021-08-29 ENCOUNTER — OFFICE VISIT (OUTPATIENT)
Dept: URGENT CARE | Facility: CLINIC | Age: 36
End: 2021-08-29
Payer: MEDICAID

## 2021-08-29 ENCOUNTER — APPOINTMENT (OUTPATIENT)
Dept: RADIOLOGY | Facility: IMAGING CENTER | Age: 36
End: 2021-08-29
Attending: FAMILY MEDICINE
Payer: MEDICAID

## 2021-08-29 VITALS
TEMPERATURE: 96.5 F | WEIGHT: 198.8 LBS | BODY MASS INDEX: 40.08 KG/M2 | SYSTOLIC BLOOD PRESSURE: 140 MMHG | HEART RATE: 100 BPM | DIASTOLIC BLOOD PRESSURE: 80 MMHG | OXYGEN SATURATION: 96 % | HEIGHT: 59 IN | RESPIRATION RATE: 16 BRPM

## 2021-08-29 DIAGNOSIS — R05.2 SUBACUTE COUGH: ICD-10-CM

## 2021-08-29 PROBLEM — I10 HTN (HYPERTENSION): Status: ACTIVE | Noted: 2018-07-09

## 2021-08-29 PROBLEM — F43.10 PTSD (POST-TRAUMATIC STRESS DISORDER): Status: ACTIVE | Noted: 2018-07-09

## 2021-08-29 PROBLEM — F41.9 ANXIETY: Status: ACTIVE | Noted: 2018-07-09

## 2021-08-29 PROBLEM — F31.9 BIPOLAR DISORDER (HCC): Status: ACTIVE | Noted: 2018-07-09

## 2021-08-29 PROBLEM — R53.83 FATIGUE: Status: ACTIVE | Noted: 2018-07-09

## 2021-08-29 PROCEDURE — U0003 INFECTIOUS AGENT DETECTION BY NUCLEIC ACID (DNA OR RNA); SEVERE ACUTE RESPIRATORY SYNDROME CORONAVIRUS 2 (SARS-COV-2) (CORONAVIRUS DISEASE [COVID-19]), AMPLIFIED PROBE TECHNIQUE, MAKING USE OF HIGH THROUGHPUT TECHNOLOGIES AS DESCRIBED BY CMS-2020-01-R: HCPCS

## 2021-08-29 PROCEDURE — 71046 X-RAY EXAM CHEST 2 VIEWS: CPT | Mod: TC | Performed by: FAMILY MEDICINE

## 2021-08-29 PROCEDURE — U0005 INFEC AGEN DETEC AMPLI PROBE: HCPCS

## 2021-08-29 PROCEDURE — 99213 OFFICE O/P EST LOW 20 MIN: CPT | Performed by: FAMILY MEDICINE

## 2021-08-29 RX ORDER — DEXAMETHASONE 4 MG/1
2 TABLET ORAL 2 TIMES DAILY
Qty: 1 EACH | Refills: 0 | Status: SHIPPED | OUTPATIENT
Start: 2021-08-29 | End: 2022-02-19

## 2021-08-29 RX ORDER — DOXYCYCLINE HYCLATE 100 MG
100 TABLET ORAL 2 TIMES DAILY
Qty: 10 TABLET | Refills: 0 | Status: SHIPPED | OUTPATIENT
Start: 2021-08-29 | End: 2021-09-03

## 2021-08-29 RX ORDER — BENZONATATE 200 MG/1
200 CAPSULE ORAL 3 TIMES DAILY PRN
Qty: 30 CAPSULE | Refills: 0 | Status: SHIPPED | OUTPATIENT
Start: 2021-08-29 | End: 2022-02-19

## 2021-08-29 ASSESSMENT — ENCOUNTER SYMPTOMS
EYE REDNESS: 0
VOMITING: 0
MYALGIAS: 0
WEIGHT LOSS: 0
NAUSEA: 0
EYE DISCHARGE: 0

## 2021-08-29 NOTE — PROGRESS NOTES
"Subjective     Lindsey Gomes is a 36 y.o. female who presents with Cough (Unvaccinated - x 2 months, dry cough, fatigue)            2 months dry cough. No shortness of breath or wheezing. No fever. Associated with fatigue. She notes that she requires Covid testing. Denies reflux. She does have fairly regular PND. No other aggravating or alleviating factors.      Review of Systems   Constitutional: Positive for malaise/fatigue. Negative for weight loss.   Eyes: Negative for discharge and redness.   Gastrointestinal: Negative for nausea and vomiting.   Musculoskeletal: Negative for joint pain and myalgias.   Skin: Negative for itching and rash.              Objective     /80 (BP Location: Right arm, Patient Position: Sitting, BP Cuff Size: Adult)   Pulse 100   Temp 35.8 °C (96.5 °F) (Temporal)   Resp 16   Ht 1.499 m (4' 11\")   Wt 90.2 kg (198 lb 12.8 oz)   SpO2 96%   BMI 40.15 kg/m²      Physical Exam  Constitutional:       General: She is not in acute distress.     Appearance: She is well-developed.   HENT:      Head: Normocephalic and atraumatic.   Eyes:      Conjunctiva/sclera: Conjunctivae normal.   Cardiovascular:      Rate and Rhythm: Normal rate and regular rhythm.      Heart sounds: Normal heart sounds. No murmur heard.     Pulmonary:      Effort: Pulmonary effort is normal.      Breath sounds: Normal breath sounds. No wheezing.   Skin:     General: Skin is warm and dry.      Findings: No rash.   Neurological:      Mental Status: She is alert and oriented to person, place, and time.                             Assessment & Plan       CXR: no acute cardiopulmonary process per radiology    1. Subacute cough  DX-CHEST-2 VIEWS    benzonatate (TESSALON) 200 MG capsule    fluticasone (FLOVENT HFA) 110 MCG/ACT Aerosol    doxycycline (VIBRAMYCIN) 100 MG Tab    COVID/SARS CoV-2 PCR     F/u c19 testing    Differential diagnosis, natural history, supportive care, and indications for immediate " follow-up discussed at length.

## 2021-08-30 DIAGNOSIS — R05.2 SUBACUTE COUGH: ICD-10-CM

## 2021-08-30 LAB — COVID ORDER STATUS COVID19: NORMAL

## 2021-08-31 LAB
SARS-COV-2 RNA RESP QL NAA+PROBE: NOTDETECTED
SPECIMEN SOURCE: NORMAL

## 2022-01-23 ENCOUNTER — HOSPITAL ENCOUNTER (OUTPATIENT)
Facility: MEDICAL CENTER | Age: 37
End: 2022-01-23
Attending: STUDENT IN AN ORGANIZED HEALTH CARE EDUCATION/TRAINING PROGRAM
Payer: MEDICAID

## 2022-01-23 ENCOUNTER — OFFICE VISIT (OUTPATIENT)
Dept: URGENT CARE | Facility: CLINIC | Age: 37
End: 2022-01-23
Payer: MEDICAID

## 2022-01-23 VITALS
WEIGHT: 214 LBS | RESPIRATION RATE: 14 BRPM | HEART RATE: 72 BPM | SYSTOLIC BLOOD PRESSURE: 120 MMHG | BODY MASS INDEX: 43.14 KG/M2 | DIASTOLIC BLOOD PRESSURE: 88 MMHG | TEMPERATURE: 97.7 F | HEIGHT: 59 IN | OXYGEN SATURATION: 95 %

## 2022-01-23 DIAGNOSIS — R53.81 MALAISE AND FATIGUE: ICD-10-CM

## 2022-01-23 DIAGNOSIS — R53.83 MALAISE AND FATIGUE: ICD-10-CM

## 2022-01-23 LAB — COVID ORDER STATUS COVID19: NORMAL

## 2022-01-23 PROCEDURE — U0003 INFECTIOUS AGENT DETECTION BY NUCLEIC ACID (DNA OR RNA); SEVERE ACUTE RESPIRATORY SYNDROME CORONAVIRUS 2 (SARS-COV-2) (CORONAVIRUS DISEASE [COVID-19]), AMPLIFIED PROBE TECHNIQUE, MAKING USE OF HIGH THROUGHPUT TECHNOLOGIES AS DESCRIBED BY CMS-2020-01-R: HCPCS

## 2022-01-23 PROCEDURE — 99213 OFFICE O/P EST LOW 20 MIN: CPT | Performed by: STUDENT IN AN ORGANIZED HEALTH CARE EDUCATION/TRAINING PROGRAM

## 2022-01-23 PROCEDURE — U0005 INFEC AGEN DETEC AMPLI PROBE: HCPCS

## 2022-01-23 ASSESSMENT — ENCOUNTER SYMPTOMS
FEVER: 1
CHILLS: 1
MYALGIAS: 1
COUGH: 1

## 2022-01-23 NOTE — PROGRESS NOTES
"Subjective     Lindsey Gomes is a 36 y.o. female who presents with Coronavirus Screening (cough, fatigue, runny nose, exposure, x 3 days needs covid test for work)            Patient is a 36-year-old female completely not vaccinated for COVID-19 who presents clinic with complaints of subjective fevers rigors chills in addition to generalized malaise and myalgias.  Patient was to be tested for COVID-19.      Review of Systems   Constitutional: Positive for chills, fever and malaise/fatigue.   HENT: Positive for congestion.    Respiratory: Positive for cough.    Musculoskeletal: Positive for myalgias.   All other systems reviewed and are negative.             Objective     /88   Pulse 72   Temp 36.5 °C (97.7 °F)   Resp 14   Ht 1.499 m (4' 11\")   Wt 97.1 kg (214 lb)   SpO2 95%   BMI 43.22 kg/m²      Physical Exam  Vitals reviewed.   Constitutional:       Appearance: She is ill-appearing.   HENT:      Nose: Congestion and rhinorrhea present.   Neurological:      Mental Status: She is alert.                             Assessment & Plan        1. Malaise and fatigue  36-year-old female poorly not vaccinated for COVID-19 presents to clinic with complaints of subjective fevers rigors chills in addition to generalized malaise myalgias.  Plan:  1.  COVID-19 PCR testing    Patient was counseled that her results will be available within 24 to 48 hours on her MyChart via her mobile device.  Patient endorsed understanding.  - SARS-CoV-2 PCR (24 hour In-House): Collect NP swab in VTM; Future                "

## 2022-01-24 LAB
SARS-COV-2 RNA RESP QL NAA+PROBE: NOTDETECTED
SPECIMEN SOURCE: NORMAL

## 2022-02-19 ENCOUNTER — OFFICE VISIT (OUTPATIENT)
Dept: URGENT CARE | Facility: CLINIC | Age: 37
End: 2022-02-19
Payer: MEDICAID

## 2022-02-19 ENCOUNTER — APPOINTMENT (OUTPATIENT)
Dept: RADIOLOGY | Facility: IMAGING CENTER | Age: 37
End: 2022-02-19
Attending: NURSE PRACTITIONER
Payer: MEDICAID

## 2022-02-19 VITALS
BODY MASS INDEX: 41.41 KG/M2 | HEIGHT: 59 IN | WEIGHT: 205.4 LBS | RESPIRATION RATE: 24 BRPM | HEART RATE: 104 BPM | DIASTOLIC BLOOD PRESSURE: 72 MMHG | TEMPERATURE: 98 F | SYSTOLIC BLOOD PRESSURE: 138 MMHG | OXYGEN SATURATION: 90 %

## 2022-02-19 DIAGNOSIS — U07.1 COVID: ICD-10-CM

## 2022-02-19 DIAGNOSIS — R05.9 COUGH: ICD-10-CM

## 2022-02-19 DIAGNOSIS — J12.82 PNEUMONIA DUE TO COVID-19 VIRUS: ICD-10-CM

## 2022-02-19 DIAGNOSIS — U07.1 PNEUMONIA DUE TO COVID-19 VIRUS: ICD-10-CM

## 2022-02-19 PROCEDURE — 94640 AIRWAY INHALATION TREATMENT: CPT | Performed by: NURSE PRACTITIONER

## 2022-02-19 PROCEDURE — 99214 OFFICE O/P EST MOD 30 MIN: CPT | Mod: 25,CS | Performed by: NURSE PRACTITIONER

## 2022-02-19 PROCEDURE — 71046 X-RAY EXAM CHEST 2 VIEWS: CPT | Mod: TC | Performed by: NURSE PRACTITIONER

## 2022-02-19 RX ORDER — ALBUTEROL SULFATE 90 UG/1
2 AEROSOL, METERED RESPIRATORY (INHALATION) EVERY 6 HOURS PRN
Qty: 8.5 G | Refills: 0 | Status: SHIPPED | OUTPATIENT
Start: 2022-02-19 | End: 2023-02-24

## 2022-02-19 RX ORDER — AZITHROMYCIN 250 MG/1
TABLET, FILM COATED ORAL
Qty: 6 TABLET | Refills: 0 | Status: SHIPPED | OUTPATIENT
Start: 2022-02-19 | End: 2022-03-05

## 2022-02-19 RX ORDER — PREDNISONE 10 MG/1
40 TABLET ORAL DAILY
Qty: 20 TABLET | Refills: 0 | Status: SHIPPED | OUTPATIENT
Start: 2022-02-19 | End: 2022-02-24

## 2022-02-19 RX ORDER — IPRATROPIUM BROMIDE AND ALBUTEROL SULFATE 2.5; .5 MG/3ML; MG/3ML
3 SOLUTION RESPIRATORY (INHALATION) ONCE
Status: COMPLETED | OUTPATIENT
Start: 2022-02-19 | End: 2022-02-19

## 2022-02-19 RX ADMIN — IPRATROPIUM BROMIDE AND ALBUTEROL SULFATE 3 ML: 2.5; .5 SOLUTION RESPIRATORY (INHALATION) at 10:37

## 2022-02-19 ASSESSMENT — ENCOUNTER SYMPTOMS
EYE PAIN: 0
VOMITING: 0
HEADACHES: 1
SWOLLEN GLANDS: 0
CHILLS: 1
FEVER: 1
SHORTNESS OF BREATH: 0
SPUTUM PRODUCTION: 1
NAUSEA: 0
MYALGIAS: 0
SORE THROAT: 0
DIZZINESS: 0
COUGH: 1
WHEEZING: 1
RHINORRHEA: 1

## 2022-02-19 NOTE — PROGRESS NOTES
Subjective:   Lindsey Gomes is a 36 y.o. female who presents for Cough (Chest/nasal congestion/fever/x2weks)      URI   This is a new problem. Episode onset: 2 weeks; positive covid 2/8. is not vaccniated. The problem has been gradually worsening. The maximum temperature recorded prior to her arrival was 100.4 - 100.9 F. The fever has been present for 3 to 4 days. Associated symptoms include congestion, coughing, headaches, rhinorrhea and wheezing. Pertinent negatives include no chest pain, dysuria, nausea, plugged ear sensation, rash, sore throat, swollen glands or vomiting. She has tried acetaminophen for the symptoms. The treatment provided no relief.       Review of Systems   Constitutional: Positive for chills, fever and malaise/fatigue.   HENT: Positive for congestion and rhinorrhea. Negative for sore throat.    Eyes: Negative for pain.   Respiratory: Positive for cough, sputum production and wheezing. Negative for shortness of breath.    Cardiovascular: Negative for chest pain.   Gastrointestinal: Negative for nausea and vomiting.   Genitourinary: Negative for dysuria and hematuria.   Musculoskeletal: Negative for myalgias.   Skin: Negative for rash.   Neurological: Positive for headaches. Negative for dizziness.       Medications:    • benzonatate  • Flovent HFA Aero  • hydrOXYzine HCl Tabs  • ipratropium-albuterol  • metoprolol SR Tb24  • MOTRIN PO  • Naproxen Sodium Caps  • prazosin Caps    Allergies: Pcn [penicillins]    Problem List: Lindsey Gomes does not have any pertinent problems on file.    Surgical History:  Past Surgical History:   Procedure Laterality Date   • MD LAP,DIAGNOSTIC ABDOMEN N/A 5/27/2020    Procedure: PELVISCOPY;  Surgeon: Hussein Gordon M.D.;  Location: SURGERY SAME DAY [facility];  Service: Obstetrics   • MD REMOVAL OF CONTRACEPTIVE CAPSUL Left 5/27/2020    Procedure: REMOVAL, Nexplanon;  Surgeon: Hussein Gordon M.D.;  Location: SURGERY SAME DAY  "[facility];  Service: Obstetrics   • SALPINGECTOMY Bilateral 5/27/2020    Procedure: SALPINGECTOMY;  Surgeon: Hussein Gordon M.D.;  Location: SURGERY SAME DAY [facility];  Service: Obstetrics   • REPEAT C SECTION  11/6/2014    Performed by Stephani Swanson M.D. at LABOR AND DELIVERY   • RHINOPLASTY  2005   • CRANIOTOMY      open cranium at 3 months old   • MAMMOPLASTY REDUCTION Bilateral        Past Social Hx: Lindsey Gomes  reports that she has never smoked. She has never used smokeless tobacco. She reports previous alcohol use. She reports previous drug use. Drug: Marijuana.     Past Family Hx:  Lindsey Gomes family history includes Depression in her father; Diabetes in her father and maternal grandfather; Heart Disease in her father and maternal grandfather; Thyroid in her mother.     Problem list, medications, and allergies reviewed by myself today in Epic.     Objective:     /72 (BP Location: Left arm, Patient Position: Sitting)   Pulse (!) 104   Temp 36.7 °C (98 °F) (Temporal)   Resp (!) 24   Ht 1.499 m (4' 11\")   Wt 93.2 kg (205 lb 6.4 oz)   SpO2 90%   BMI 41.49 kg/m²     Physical Exam  Vitals and nursing note reviewed.   Constitutional:       General: She is not in acute distress.     Appearance: She is well-developed.   HENT:      Head: Normocephalic and atraumatic.      Right Ear: Tympanic membrane and external ear normal.      Left Ear: Tympanic membrane and external ear normal.      Nose: Nose normal.      Right Sinus: No maxillary sinus tenderness or frontal sinus tenderness.      Left Sinus: No maxillary sinus tenderness or frontal sinus tenderness.      Mouth/Throat:      Mouth: Mucous membranes are moist.      Pharynx: Uvula midline. No posterior oropharyngeal erythema.      Tonsils: No tonsillar exudate or tonsillar abscesses.   Eyes:      General:         Right eye: No discharge.         Left eye: No discharge.      Conjunctiva/sclera: Conjunctivae normal. "   Cardiovascular:      Rate and Rhythm: Normal rate.   Pulmonary:      Effort: Pulmonary effort is normal. Tachypnea present. No respiratory distress.      Breath sounds: Wheezing present.   Abdominal:      General: There is no distension.   Musculoskeletal:         General: Normal range of motion.   Skin:     General: Skin is warm and dry.   Neurological:      General: No focal deficit present.      Mental Status: She is alert and oriented to person, place, and time. Mental status is at baseline.      Gait: Gait (gait at baseline) normal.   Psychiatric:         Judgment: Judgment normal.         Assessment/Plan:     Diagnosis and associated orders:     /  1. Pneumonia due to COVID-19 virus  DX-CHEST-2 VIEWS    ipratropium-albuterol (DUONEB) nebulizer solution    albuterol 108 (90 Base) MCG/ACT Aero Soln inhalation aerosol    predniSONE (DELTASONE) 10 MG Tab    azithromycin (ZITHROMAX) 250 MG Tab   2. Cough  albuterol 108 (90 Base) MCG/ACT Aero Soln inhalation aerosol    predniSONE (DELTASONE) 10 MG Tab    azithromycin (ZITHROMAX) 250 MG Tab     .   Comments/MDM:     I independently reviewed the patient's imaging and agree with the interpretation of the radiologist.    1.  Bilateral peripheral parenchymal opacities are consistent with Covid pneumonia.      Patient is a 36-year-old female present with the stated above, initially patient ambulating on room air at 90% declining shortness of breath however she did appear to be tachypneic, DuoNeb treatment was administered with improvement of oxygen to 93 to 94% on room air with decreased work of breathing.  X-ray does show bilateral peripheral vacancies due to Covid.  Patient has been symptomatic x14 days will treat for possible secondary infection with azithromycin and oral steroids.  Encouraged to continue with symptom supportive care. I personally reviewed prior external notes and prior test results pertinent to today's visit.   Discussed management options, risks  and benefits, and alternatives to treatment plan agreed upon.   Red flags discussed and indications to immediately call 911 or present to the Emergency Department.   Supportive care, differential diagnoses, and indications for immediate follow-up discussed with patient.    Patient expresses understanding and agrees to plan. Patient denies any other questions or concerns.              Please note that this dictation was created using voice recognition software. I have made a reasonable attempt to correct obvious errors, but I expect that there are errors of grammar and possibly content that I did not discover before finalizing the note.    This note was electronically signed by Adrian BAUER.

## 2022-03-05 ENCOUNTER — OFFICE VISIT (OUTPATIENT)
Dept: URGENT CARE | Facility: CLINIC | Age: 37
End: 2022-03-05
Payer: MEDICAID

## 2022-03-05 VITALS
WEIGHT: 210.1 LBS | RESPIRATION RATE: 16 BRPM | TEMPERATURE: 97.4 F | SYSTOLIC BLOOD PRESSURE: 126 MMHG | OXYGEN SATURATION: 100 % | HEART RATE: 82 BPM | HEIGHT: 59 IN | BODY MASS INDEX: 42.36 KG/M2 | DIASTOLIC BLOOD PRESSURE: 80 MMHG

## 2022-03-05 DIAGNOSIS — Z86.16 HISTORY OF COVID-19: ICD-10-CM

## 2022-03-05 DIAGNOSIS — R05.9 COUGH: ICD-10-CM

## 2022-03-05 PROCEDURE — 99213 OFFICE O/P EST LOW 20 MIN: CPT | Performed by: NURSE PRACTITIONER

## 2022-03-05 RX ORDER — BENZONATATE 100 MG/1
100 CAPSULE ORAL 3 TIMES DAILY PRN
Qty: 60 CAPSULE | Refills: 0 | Status: SHIPPED | OUTPATIENT
Start: 2022-03-05 | End: 2023-08-26

## 2022-03-05 RX ORDER — ARIPIPRAZOLE 10 MG/1
10 TABLET ORAL DAILY
COMMUNITY

## 2022-03-05 RX ORDER — ALBUTEROL SULFATE 90 UG/1
2 AEROSOL, METERED RESPIRATORY (INHALATION) EVERY 6 HOURS PRN
Qty: 8.5 G | Refills: 0 | Status: SHIPPED | OUTPATIENT
Start: 2022-03-05 | End: 2023-02-24

## 2022-03-05 RX ORDER — ESCITALOPRAM OXALATE 20 MG/1
20 TABLET ORAL DAILY
COMMUNITY

## 2022-03-05 ASSESSMENT — ENCOUNTER SYMPTOMS
MYALGIAS: 0
EYE PAIN: 0
COUGH: 1
RHINORRHEA: 1
VOMITING: 0
SHORTNESS OF BREATH: 1
DIZZINESS: 0
NAUSEA: 0
DIARRHEA: 1
FEVER: 0
SORE THROAT: 0
CHILLS: 0

## 2022-03-05 NOTE — PROGRESS NOTES
Subjective:   Lindsey Gomes is a 36 y.o. female who presents for Cough (Fatigue, congestion, diarrhea )      URI   This is a recurrent problem. The current episode started 1 to 4 weeks ago (Previously seen by myself diagnosed with Covid pneumonia 2/19..). The problem has been waxing and waning. There has been no fever. Associated symptoms include congestion, coughing, diarrhea and rhinorrhea. Pertinent negatives include no chest pain, nausea, rash, sore throat or vomiting. She has tried acetaminophen, inhaler use and sleep (abx) for the symptoms. The treatment provided moderate relief.       Review of Systems   Constitutional: Positive for malaise/fatigue. Negative for chills and fever.   HENT: Positive for congestion and rhinorrhea. Negative for sore throat.    Eyes: Negative for pain.   Respiratory: Positive for cough and shortness of breath.    Cardiovascular: Negative for chest pain.   Gastrointestinal: Positive for diarrhea. Negative for nausea and vomiting.   Genitourinary: Negative for hematuria.   Musculoskeletal: Negative for myalgias.   Skin: Negative for rash.   Neurological: Negative for dizziness.       Medications:    • albuterol Aers  • ARIPiprazole Tabs  • benzonatate Caps  • escitalopram  • hydrOXYzine HCl Tabs  • metoprolol SR Tb24  • MOTRIN PO    Allergies: Pcn [penicillins]    Problem List: Lindsey Gomes does not have any pertinent problems on file.    Surgical History:  Past Surgical History:   Procedure Laterality Date   • HI LAP,DIAGNOSTIC ABDOMEN N/A 5/27/2020    Procedure: PELVISCOPY;  Surgeon: Hussein Gordon M.D.;  Location: SURGERY SAME DAY [facility] ORS;  Service: Obstetrics   • HI REMOVAL OF CONTRACEPTIVE CAPSUL Left 5/27/2020    Procedure: REMOVAL, Nexplanon;  Surgeon: Hussein Gordon M.D.;  Location: SURGERY SAME DAY [facility] ORS;  Service: Obstetrics   • SALPINGECTOMY Bilateral 5/27/2020    Procedure: SALPINGECTOMY;  Surgeon: Hussein Gordon M.D.;   "Location: SURGERY SAME DAY [facility];  Service: Obstetrics   • REPEAT C SECTION  11/6/2014    Performed by Stephani Swanson M.D. at LABOR AND DELIVERY   • RHINOPLASTY  2005   • CRANIOTOMY      open cranium at 3 months old   • MAMMOPLASTY REDUCTION Bilateral        Past Social Hx: Lindsey Gomes  reports that she has never smoked. She has never used smokeless tobacco. She reports previous alcohol use. She reports previous drug use. Drug: Marijuana.     Past Family Hx:  Lindsey Gomes family history includes Depression in her father; Diabetes in her father and maternal grandfather; Heart Disease in her father and maternal grandfather; Thyroid in her mother.     Problem list, medications, and allergies reviewed by myself today in Epic.     Objective:     /80   Pulse 82   Temp 36.3 °C (97.4 °F) (Temporal)   Resp 16   Ht 1.499 m (4' 11\")   Wt 95.3 kg (210 lb 1.6 oz)   SpO2 100%   BMI 42.44 kg/m²     Physical Exam  Vitals and nursing note reviewed.   Constitutional:       General: She is not in acute distress.     Appearance: She is well-developed.   HENT:      Head: Normocephalic and atraumatic.      Right Ear: Tympanic membrane and external ear normal.      Left Ear: Tympanic membrane and external ear normal.      Nose: Nose normal.      Right Sinus: No maxillary sinus tenderness or frontal sinus tenderness.      Left Sinus: No maxillary sinus tenderness or frontal sinus tenderness.      Mouth/Throat:      Mouth: Mucous membranes are moist.      Pharynx: Uvula midline. No posterior oropharyngeal erythema.      Tonsils: No tonsillar exudate or tonsillar abscesses.   Eyes:      General:         Right eye: No discharge.         Left eye: No discharge.      Conjunctiva/sclera: Conjunctivae normal.   Cardiovascular:      Rate and Rhythm: Normal rate.   Pulmonary:      Effort: Pulmonary effort is normal. No respiratory distress.      Breath sounds: Normal breath sounds.   Abdominal:      " General: There is no distension.   Musculoskeletal:         General: Normal range of motion.   Skin:     General: Skin is warm and dry.   Neurological:      General: No focal deficit present.      Mental Status: She is alert and oriented to person, place, and time. Mental status is at baseline.      Gait: Gait (gait at baseline) normal.   Psychiatric:         Judgment: Judgment normal.         Assessment/Plan:     Diagnosis and associated orders:     1. Cough  albuterol 108 (90 Base) MCG/ACT Aero Soln inhalation aerosol    benzonatate (TESSALON) 100 MG Cap   2. History of COVID-19  albuterol 108 (90 Base) MCG/ACT Aero Soln inhalation aerosol    benzonatate (TESSALON) 100 MG Cap      Comments/MDM:     I personally reviewed prior external notes and prior test results pertinent to today's visit.  Patient was evaluated by myself 2/19 x-ray consistent with Covid pneumonia she was treated day of exam with azithromycin did have improvement of symptoms on exam today lung sounds clear to auscultation bilaterally, pulse ox 100% initially pulse ox was low patient is not hypoxic today and/or having any increased work of breathing reassured patient of clinical findings do not feel antibiotics are necessary at today's visit.  We will continue with symptom supportive care will refill albuterol as needed.  Discussed management options, risks and benefits, and alternatives to treatment plan agreed upon.   Red flags discussed and indications to immediately call 911 or present to the Emergency Department.   Supportive care, differential diagnoses, and indications for immediate follow-up discussed with patient.    • Patient expresses understanding and agrees to plan. Patient denies any other questions or concerns.                Please note that this dictation was created using voice recognition software. I have made a reasonable attempt to correct obvious errors, but I expect that there are errors of grammar and possibly content that  I did not discover before finalizing the note.    This note was electronically signed by Adrian BAUER.

## 2023-02-24 ENCOUNTER — OFFICE VISIT (OUTPATIENT)
Dept: URGENT CARE | Facility: CLINIC | Age: 38
End: 2023-02-24
Payer: MEDICAID

## 2023-02-24 VITALS
WEIGHT: 198.7 LBS | SYSTOLIC BLOOD PRESSURE: 142 MMHG | OXYGEN SATURATION: 94 % | BODY MASS INDEX: 40.06 KG/M2 | TEMPERATURE: 97.6 F | HEART RATE: 81 BPM | DIASTOLIC BLOOD PRESSURE: 94 MMHG | RESPIRATION RATE: 20 BRPM | HEIGHT: 59 IN

## 2023-02-24 DIAGNOSIS — H60.503 ACUTE OTITIS EXTERNA OF BOTH EARS, UNSPECIFIED TYPE: ICD-10-CM

## 2023-02-24 DIAGNOSIS — H61.23 BILATERAL IMPACTED CERUMEN: ICD-10-CM

## 2023-02-24 PROCEDURE — 99212 OFFICE O/P EST SF 10 MIN: CPT | Mod: 25 | Performed by: PHYSICIAN ASSISTANT

## 2023-02-24 PROCEDURE — 69210 REMOVE IMPACTED EAR WAX UNI: CPT | Mod: 50 | Performed by: PHYSICIAN ASSISTANT

## 2023-02-24 RX ORDER — METFORMIN HYDROCHLORIDE 500 MG/1
500 TABLET, EXTENDED RELEASE ORAL
COMMUNITY
Start: 2023-02-12

## 2023-02-24 RX ORDER — PRAZOSIN HYDROCHLORIDE 5 MG/1
CAPSULE ORAL
COMMUNITY
Start: 2023-02-09

## 2023-02-24 RX ORDER — DIAZEPAM 2 MG/1
TABLET ORAL
COMMUNITY
Start: 2023-02-09

## 2023-02-24 RX ORDER — NEOMYCIN SULFATE, POLYMYXIN B SULFATE AND HYDROCORTISONE 10; 3.5; 1 MG/ML; MG/ML; [USP'U]/ML
3 SUSPENSION/ DROPS AURICULAR (OTIC) 3 TIMES DAILY
Qty: 7 ML | Refills: 0 | Status: SHIPPED | OUTPATIENT
Start: 2023-02-24 | End: 2023-03-03

## 2023-02-24 RX ORDER — METHYLPHENIDATE HYDROCHLORIDE 10 MG/1
TABLET ORAL
COMMUNITY
Start: 2023-01-31

## 2023-02-24 ASSESSMENT — ENCOUNTER SYMPTOMS
MYALGIAS: 0
VOMITING: 0
FATIGUE: 0
COUGH: 0
DIZZINESS: 0
NAUSEA: 0
VERTIGO: 0
FEVER: 0
SORE THROAT: 0
CHILLS: 0
HEADACHES: 0

## 2023-02-24 NOTE — PROGRESS NOTES
Subjective     Lindsey Gomes is a 37 y.o. female who presents with Otalgia (Pt has trouble hearing from both ears x 3 days )            Ear Fullness  This is a new problem. Episode onset: past 3 days. The problem occurs constantly. The problem has been unchanged. Pertinent negatives include no chills, congestion, coughing, fatigue, fever, headaches, myalgias, nausea, rash, sore throat, vertigo or vomiting. Associated symptoms comments: No ear pain. Muffled hearing . Nothing aggravates the symptoms. Treatments tried: OTC hydrogen peroxide drops. The treatment provided no relief.       Past Medical History:   Diagnosis Date    Anxiety     Dx 2004    Anxiety disorder     Bipolar affective (HCC)     Depression     Dx in 2004    Heart murmur     Dx as a child,  Pt states no longer having it.    HTN (hypertension) 7/9/2018    Hypertension     Renal disorder     reports sever kidney infection when pregnant in 2014    Snoring     no sleep study         Past Surgical History:   Procedure Laterality Date    MO LAP,DIAGNOSTIC ABDOMEN N/A 5/27/2020    Procedure: PELVISCOPY;  Surgeon: Hussein Gordon M.D.;  Location: SURGERY SAME DAY [facility] ORS;  Service: Obstetrics    MO REMOVAL OF CONTRACEPTIVE CAPSUL Left 5/27/2020    Procedure: REMOVAL, Nexplanon;  Surgeon: Hussein Gordon M.D.;  Location: SURGERY SAME DAY [facility] ORS;  Service: Obstetrics    SALPINGECTOMY Bilateral 5/27/2020    Procedure: SALPINGECTOMY;  Surgeon: Hussein Gordon M.D.;  Location: SURGERY SAME DAY [facility] ORS;  Service: Obstetrics    REPEAT C SECTION  11/6/2014    Performed by Stephani Swanson M.D. at LABOR AND DELIVERY    RHINOPLASTY  2005    CRANIOTOMY      open cranium at 3 months old    MAMMOPLASTY REDUCTION Bilateral            Family History   Problem Relation Age of Onset    Thyroid Mother     Depression Father     Heart Disease Father     Diabetes Father     Heart Disease Maternal Grandfather     Diabetes Maternal  "Grandfather        Allergies   Allergen Reactions    Pcn [Penicillins] Hives and Rash           Medications, Allergies, and current problem list reviewed today in Epic    Review of Systems   Constitutional:  Negative for chills, fatigue, fever and malaise/fatigue.   HENT:  Positive for hearing loss. Negative for congestion, ear discharge, ear pain, sore throat and tinnitus.    Respiratory:  Negative for cough.    Gastrointestinal:  Negative for nausea and vomiting.   Musculoskeletal:  Negative for myalgias.   Skin:  Negative for rash.   Neurological:  Negative for dizziness, vertigo and headaches.      All other systems reviewed and are negative.         Objective     BP (!) 142/94 (BP Location: Left arm, Patient Position: Sitting, BP Cuff Size: Adult)   Pulse 81   Temp 36.4 °C (97.6 °F) (Temporal)   Resp 20   Ht 1.499 m (4' 11\")   Wt 90.1 kg (198 lb 11.2 oz)   SpO2 94%   BMI 40.13 kg/m²      Physical Exam  Constitutional:       General: She is not in acute distress.     Appearance: She is not ill-appearing.   HENT:      Head: Normocephalic and atraumatic.      Right Ear: There is impacted cerumen (partial impaction).      Left Ear: There is impacted cerumen (fully impacted).   Eyes:      Conjunctiva/sclera: Conjunctivae normal.   Cardiovascular:      Rate and Rhythm: Normal rate and regular rhythm.   Pulmonary:      Effort: Pulmonary effort is normal. No respiratory distress.      Breath sounds: No stridor. No wheezing.   Skin:     General: Skin is warm and dry.   Neurological:      General: No focal deficit present.      Mental Status: She is alert and oriented to person, place, and time.   Psychiatric:         Mood and Affect: Mood normal.         Behavior: Behavior normal.         Thought Content: Thought content normal.         Judgment: Judgment normal.                           Assessment & Plan        1. Bilateral impacted cerumen  2. Otitis externa   Right ear lavage performed by Medical assistant. " Mild canal edema after lavage.  Left ear lavage performed by Medical assistant with some residual wax. Lavage performed by me. Curette used by me to remove large chunk of cerumen. Mild residual left ear canal edema and erythema. Mild signs of otitis externa both ears.    Differential diagnoses, Supportive care, and indications for immediate follow-up discussed with patient.   Pathogenesis of diagnosis discussed including typical length and natural progression.   Instructed to return to clinic or nearest emergency department for any change in condition, further concerns, or worsening of symptoms.        The patient demonstrated a good understanding and agreed with the treatment plan.      Margarita Vazquez P.A.-C.

## 2023-05-28 ENCOUNTER — HOSPITAL ENCOUNTER (EMERGENCY)
Facility: MEDICAL CENTER | Age: 38
End: 2023-05-28
Attending: EMERGENCY MEDICINE
Payer: MEDICAID

## 2023-05-28 ENCOUNTER — OFFICE VISIT (OUTPATIENT)
Dept: URGENT CARE | Facility: CLINIC | Age: 38
End: 2023-05-28
Payer: MEDICAID

## 2023-05-28 ENCOUNTER — APPOINTMENT (OUTPATIENT)
Dept: RADIOLOGY | Facility: MEDICAL CENTER | Age: 38
End: 2023-05-28
Attending: EMERGENCY MEDICINE
Payer: MEDICAID

## 2023-05-28 VITALS
HEART RATE: 76 BPM | OXYGEN SATURATION: 100 % | BODY MASS INDEX: 40.92 KG/M2 | RESPIRATION RATE: 16 BRPM | WEIGHT: 203 LBS | HEIGHT: 59 IN | SYSTOLIC BLOOD PRESSURE: 146 MMHG | DIASTOLIC BLOOD PRESSURE: 80 MMHG | TEMPERATURE: 97.7 F

## 2023-05-28 VITALS
OXYGEN SATURATION: 94 % | RESPIRATION RATE: 14 BRPM | HEIGHT: 59 IN | WEIGHT: 203.26 LBS | DIASTOLIC BLOOD PRESSURE: 81 MMHG | BODY MASS INDEX: 40.98 KG/M2 | TEMPERATURE: 98.7 F | SYSTOLIC BLOOD PRESSURE: 136 MMHG | HEART RATE: 74 BPM

## 2023-05-28 DIAGNOSIS — R10.11 RIGHT UPPER QUADRANT ABDOMINAL PAIN: ICD-10-CM

## 2023-05-28 DIAGNOSIS — K29.00 ACUTE SUPERFICIAL GASTRITIS WITHOUT HEMORRHAGE: ICD-10-CM

## 2023-05-28 DIAGNOSIS — R10.10 PAIN OF UPPER ABDOMEN: ICD-10-CM

## 2023-05-28 DIAGNOSIS — K92.1 BLACK TARRY STOOLS: ICD-10-CM

## 2023-05-28 LAB
ALBUMIN SERPL BCP-MCNC: 4 G/DL (ref 3.2–4.9)
ALBUMIN/GLOB SERPL: 1.3 G/DL
ALP SERPL-CCNC: 79 U/L (ref 30–99)
ALT SERPL-CCNC: 76 U/L (ref 2–50)
ANION GAP SERPL CALC-SCNC: 13 MMOL/L (ref 7–16)
AST SERPL-CCNC: 53 U/L (ref 12–45)
BASOPHILS # BLD AUTO: 0.3 % (ref 0–1.8)
BASOPHILS # BLD: 0.03 K/UL (ref 0–0.12)
BILIRUB SERPL-MCNC: 0.5 MG/DL (ref 0.1–1.5)
BUN SERPL-MCNC: 11 MG/DL (ref 8–22)
CALCIUM ALBUM COR SERPL-MCNC: 9.1 MG/DL (ref 8.5–10.5)
CALCIUM SERPL-MCNC: 9.1 MG/DL (ref 8.5–10.5)
CHLORIDE SERPL-SCNC: 100 MMOL/L (ref 96–112)
CO2 SERPL-SCNC: 25 MMOL/L (ref 20–33)
CREAT SERPL-MCNC: 0.86 MG/DL (ref 0.5–1.4)
EOSINOPHIL # BLD AUTO: 0.19 K/UL (ref 0–0.51)
EOSINOPHIL NFR BLD: 2.1 % (ref 0–6.9)
ERYTHROCYTE [DISTWIDTH] IN BLOOD BY AUTOMATED COUNT: 41.1 FL (ref 35.9–50)
GFR SERPLBLD CREATININE-BSD FMLA CKD-EPI: 88 ML/MIN/1.73 M 2
GLOBULIN SER CALC-MCNC: 3.1 G/DL (ref 1.9–3.5)
GLUCOSE SERPL-MCNC: 196 MG/DL (ref 65–99)
HCG SERPL QL: NEGATIVE
HCT VFR BLD AUTO: 41.5 % (ref 37–47)
HGB BLD-MCNC: 14 G/DL (ref 12–16)
IMM GRANULOCYTES # BLD AUTO: 0.03 K/UL (ref 0–0.11)
IMM GRANULOCYTES NFR BLD AUTO: 0.3 % (ref 0–0.9)
LIPASE SERPL-CCNC: 28 U/L (ref 11–82)
LYMPHOCYTES # BLD AUTO: 2.27 K/UL (ref 1–4.8)
LYMPHOCYTES NFR BLD: 25.5 % (ref 22–41)
MCH RBC QN AUTO: 30.1 PG (ref 27–33)
MCHC RBC AUTO-ENTMCNC: 33.7 G/DL (ref 32.2–35.5)
MCV RBC AUTO: 89.2 FL (ref 81.4–97.8)
MONOCYTES # BLD AUTO: 0.33 K/UL (ref 0–0.85)
MONOCYTES NFR BLD AUTO: 3.7 % (ref 0–13.4)
NEUTROPHILS # BLD AUTO: 6.04 K/UL (ref 1.82–7.42)
NEUTROPHILS NFR BLD: 68.1 % (ref 44–72)
NRBC # BLD AUTO: 0 K/UL
NRBC BLD-RTO: 0 /100 WBC (ref 0–0.2)
PLATELET # BLD AUTO: 222 K/UL (ref 164–446)
PMV BLD AUTO: 10.1 FL (ref 9–12.9)
POTASSIUM SERPL-SCNC: 3.9 MMOL/L (ref 3.6–5.5)
PROT SERPL-MCNC: 7.1 G/DL (ref 6–8.2)
RBC # BLD AUTO: 4.65 M/UL (ref 4.2–5.4)
SODIUM SERPL-SCNC: 138 MMOL/L (ref 135–145)
WBC # BLD AUTO: 8.9 K/UL (ref 4.8–10.8)

## 2023-05-28 PROCEDURE — 74177 CT ABD & PELVIS W/CONTRAST: CPT

## 2023-05-28 PROCEDURE — 96375 TX/PRO/DX INJ NEW DRUG ADDON: CPT

## 2023-05-28 PROCEDURE — 84703 CHORIONIC GONADOTROPIN ASSAY: CPT

## 2023-05-28 PROCEDURE — 99214 OFFICE O/P EST MOD 30 MIN: CPT

## 2023-05-28 PROCEDURE — 36415 COLL VENOUS BLD VENIPUNCTURE: CPT

## 2023-05-28 PROCEDURE — 700117 HCHG RX CONTRAST REV CODE 255: Mod: UD | Performed by: EMERGENCY MEDICINE

## 2023-05-28 PROCEDURE — 85025 COMPLETE CBC W/AUTO DIFF WBC: CPT

## 2023-05-28 PROCEDURE — 3079F DIAST BP 80-89 MM HG: CPT

## 2023-05-28 PROCEDURE — 96374 THER/PROPH/DIAG INJ IV PUSH: CPT | Mod: XU

## 2023-05-28 PROCEDURE — 700111 HCHG RX REV CODE 636 W/ 250 OVERRIDE (IP): Mod: UD | Performed by: EMERGENCY MEDICINE

## 2023-05-28 PROCEDURE — 3077F SYST BP >= 140 MM HG: CPT

## 2023-05-28 PROCEDURE — 83690 ASSAY OF LIPASE: CPT

## 2023-05-28 PROCEDURE — 99284 EMERGENCY DEPT VISIT MOD MDM: CPT

## 2023-05-28 PROCEDURE — 80053 COMPREHEN METABOLIC PANEL: CPT

## 2023-05-28 RX ORDER — SODIUM CHLORIDE, SODIUM LACTATE, POTASSIUM CHLORIDE, CALCIUM CHLORIDE 600; 310; 30; 20 MG/100ML; MG/100ML; MG/100ML; MG/100ML
1000 INJECTION, SOLUTION INTRAVENOUS ONCE
Status: DISCONTINUED | OUTPATIENT
Start: 2023-05-28 | End: 2023-05-28

## 2023-05-28 RX ORDER — ONDANSETRON 2 MG/ML
4 INJECTION INTRAMUSCULAR; INTRAVENOUS ONCE
Status: COMPLETED | OUTPATIENT
Start: 2023-05-28 | End: 2023-05-28

## 2023-05-28 RX ORDER — ONDANSETRON 4 MG/1
4 TABLET, ORALLY DISINTEGRATING ORAL ONCE
Status: COMPLETED | OUTPATIENT
Start: 2023-05-28 | End: 2023-05-28

## 2023-05-28 RX ORDER — ESOMEPRAZOLE MAGNESIUM 40 MG/1
40 CAPSULE, DELAYED RELEASE ORAL
Qty: 30 CAPSULE | Refills: 0 | Status: SHIPPED | OUTPATIENT
Start: 2023-05-28 | End: 2023-08-26

## 2023-05-28 RX ORDER — SUCRALFATE 1 G/1
1 TABLET ORAL
Qty: 120 TABLET | Refills: 3 | Status: SHIPPED | OUTPATIENT
Start: 2023-05-28

## 2023-05-28 RX ORDER — HYDROMORPHONE HYDROCHLORIDE 1 MG/ML
1 INJECTION, SOLUTION INTRAMUSCULAR; INTRAVENOUS; SUBCUTANEOUS ONCE
Status: COMPLETED | OUTPATIENT
Start: 2023-05-28 | End: 2023-05-28

## 2023-05-28 RX ORDER — ONDANSETRON 4 MG/1
4 TABLET, ORALLY DISINTEGRATING ORAL EVERY 6 HOURS PRN
Qty: 10 TABLET | Refills: 0 | Status: SHIPPED | OUTPATIENT
Start: 2023-05-28 | End: 2023-08-26

## 2023-05-28 RX ADMIN — ONDANSETRON 4 MG: 4 TABLET, ORALLY DISINTEGRATING ORAL at 15:42

## 2023-05-28 RX ADMIN — ONDANSETRON 4 MG: 2 INJECTION INTRAMUSCULAR; INTRAVENOUS at 13:52

## 2023-05-28 RX ADMIN — IOHEXOL 100 ML: 350 INJECTION, SOLUTION INTRAVENOUS at 14:58

## 2023-05-28 RX ADMIN — HYDROMORPHONE HYDROCHLORIDE 1 MG: 1 INJECTION, SOLUTION INTRAMUSCULAR; INTRAVENOUS; SUBCUTANEOUS at 13:52

## 2023-05-28 ASSESSMENT — ENCOUNTER SYMPTOMS
VOMITING: 0
WHEEZING: 0
HEARTBURN: 0
NECK PAIN: 0
BACK PAIN: 1
SHORTNESS OF BREATH: 0
CHILLS: 0
FLANK PAIN: 0
BLOOD IN STOOL: 1
COUGH: 0
DIAPHORESIS: 0
MYALGIAS: 0
HEADACHES: 0
ABDOMINAL PAIN: 1
WEIGHT LOSS: 0
NAUSEA: 1
CONSTIPATION: 0
DIARRHEA: 1
FALLS: 0
FEVER: 0

## 2023-05-28 NOTE — ED NOTES
Discharge instructions given to pt including follow up with pcp or returning if no improvement of symptoms or to return if worse. Prescription x 3 provided to pt. Questions answered by RN. Denies any new complaints. Discharged w/stable vitals and able to ambulate to the w/steady gait.

## 2023-05-28 NOTE — ED NOTES
Verbalized decrease and relief of pain, aaox4 on 02 @ 2l/nc sating 95%. Rates pain as 5/10 from 8/10

## 2023-05-28 NOTE — ED NOTES
Ambulated to room w/steady gait. Chart up for erp to see. Call light within reach. Instructed to call staff for assistance.

## 2023-05-28 NOTE — DISCHARGE INSTRUCTIONS
Do not take any over-the-counter aspirin Aleve ibuprofen or NSAIDs.  Return here immediately for worsening pain high fevers vomiting blood black or bloody stools.

## 2023-05-28 NOTE — ED TRIAGE NOTES
Amb to triage, sent from  for further eval.  Patient c/o R sided abd pain, R flank pain and diarrhea x 1 month.  Reports black tarry stools today.  PWD.  No distress noted.

## 2023-05-28 NOTE — ED PROVIDER NOTES
ED Provider Note    CHIEF COMPLAINT  Chief Complaint   Patient presents with    Abdominal Pain    Flank Pain       EXTERNAL RECORDS REVIEWED  Reviewed extensive outpatient clinic visits, obstetrical records gynecological consult    HPI/ROS  LIMITATION TO HISTORY   None  OUTSIDE HISTORIAN(S):  Partner provided additional history    Lindsey Gomes is a 37 y.o. female who presents for evaluation of diffuse epigastric discomfort nausea and vomiting with reported dark-colored stools.  Patient has had symptoms for several weeks.  She went to urgent care they were concerned about possible GI bleeding and they referred her here for higher level of care.  She has no known history of GI bleeding she does not take any antiplatelet agents or anticoagulants or over-the-counter NSAIDs.  No report of any hematemesis or hematochezia nausea.  She has not been taking any Pepto-Bismol or iron supplements.  Pain is primarily epigastric radiates to her right flank no urological urinary symptoms such as dysuria or hematuria or frequency no vaginal bleeding or discharge.  Nothing seems to make pain better or worse has been present for several weeks now nothing    PAST MEDICAL HISTORY   has a past medical history of Anxiety, Anxiety disorder, Bipolar affective (HCC), Depression, Fatty liver, Heart murmur, HTN (hypertension) (07/09/2018), Hypertension, Renal disorder, and Snoring.    SURGICAL HISTORY   has a past surgical history that includes rhinoplasty (2005); craniotomy; repeat c section (11/6/2014); mammoplasty reduction (Bilateral); lap,diagnostic abdomen (N/A, 5/27/2020); removal of contraceptive capsul (Left, 5/27/2020); and salpingectomy (Bilateral, 5/27/2020).    FAMILY HISTORY  Family History   Problem Relation Age of Onset    Thyroid Mother     Depression Father     Heart Disease Father     Diabetes Father     Heart Disease Maternal Grandfather     Diabetes Maternal Grandfather        SOCIAL HISTORY  Social History  "    Tobacco Use    Smoking status: Never    Smokeless tobacco: Never   Vaping Use    Vaping Use: Never used   Substance and Sexual Activity    Alcohol use: Not Currently    Drug use: Not Currently     Types: Marijuana    Sexual activity: Yes     Partners: Male       CURRENT MEDICATIONS  No current facility-administered medications for this encounter.    Current Outpatient Medications:     diazePAM (VALIUM) 2 MG Tab, PLEASE SEE ATTACHED FOR DETAILED DIRECTIONS, Disp: , Rfl:     prazosin (MINIPRESS) 5 MG Cap, TAKE 1 CAPSULE BY MOUTH AT BEDTIME FOR NIGHTMARES-ASSOCIATED WITH PTSD. ICD-10 F43.10, Disp: , Rfl:     metFORMIN ER (GLUCOPHAGE XR) 500 MG TABLET SR 24 HR, Take 500 mg by mouth every day., Disp: , Rfl:     methylphenidate (RITALIN) 10 MG Tab, PLEASE SEE ATTACHED FOR DETAILED DIRECTIONS, Disp: , Rfl:     ARIPiprazole (ABILIFY) 10 MG Tab, Take 10 mg by mouth every day., Disp: , Rfl:     escitalopram (LEXAPRO) 20 MG tablet, Take 20 mg by mouth every day., Disp: , Rfl:     benzonatate (TESSALON) 100 MG Cap, Take 1 Capsule by mouth 3 times a day as needed for Cough. (Patient not taking: Reported on 5/28/2023), Disp: 60 Capsule, Rfl: 0    Ibuprofen (MOTRIN PO), Take  by mouth., Disp: , Rfl:     hydrOXYzine HCl (ATARAX) 25 MG Tab, Take 25 mg by mouth 3 times a day as needed for Anxiety., Disp: , Rfl:     metoprolol SR (TOPROL XL) 50 MG TABLET SR 24 HR, Take 100 mg by mouth every evening. Indications: High Blood Pressure Disorder, Disp: , Rfl:         ALLERGIES  Allergies   Allergen Reactions    Pcn [Penicillins] Hives and Rash       PHYSICAL EXAM  VITAL SIGNS: BP (!) 154/86   Pulse 73   Temp 37.2 °C (98.9 °F) (Temporal)   Resp 14   Ht 1.499 m (4' 11\")   Wt 92.2 kg (203 lb 4.2 oz)   SpO2 94%   BMI 41.05 kg/m²    Pulse ox interpretation: I interpret this pulse ox as normal.  Constitutional: Alert and oriented x 3, moderate distress  HEENT: Atraumatic normocephalic, pupils are equal round reactive to light " extraocular movements are intact. The nares is clear, external ears are normal, mouth shows dry mucous membranes normal dentition for age  Neck: Supple, no JVD no tracheal deviation  Cardiovascular: Regular rate and rhythm no murmur rub or gallop 2+ pulses peripherally x4  Thorax & Lungs: No respiratory distress, no wheezes rales or rhonchi, No chest tenderness.   GI: Soft nontender nondistended positive bowel sounds, no peritoneal signs mild right flank discomfort  Skin: Warm dry no acute rash or lesion  Musculoskeletal: Moving all extremities with full range and 5 of 5 strength no acute  deformity  Neurologic: Cranial nerves III through XII are grossly intact no sensory deficit no cerebellar dysfunction   Psychiatric: Anxious          DIAGNOSTIC STUDIES / PROCEDURES      LABS  Results for orders placed or performed during the hospital encounter of 05/28/23   CBC WITH DIFFERENTIAL   Result Value Ref Range    WBC 8.9 4.8 - 10.8 K/uL    RBC 4.65 4.20 - 5.40 M/uL    Hemoglobin 14.0 12.0 - 16.0 g/dL    Hematocrit 41.5 37.0 - 47.0 %    MCV 89.2 81.4 - 97.8 fL    MCH 30.1 27.0 - 33.0 pg    MCHC 33.7 32.2 - 35.5 g/dL    RDW 41.1 35.9 - 50.0 fL    Platelet Count 222 164 - 446 K/uL    MPV 10.1 9.0 - 12.9 fL    Neutrophils-Polys 68.10 44.00 - 72.00 %    Lymphocytes 25.50 22.00 - 41.00 %    Monocytes 3.70 0.00 - 13.40 %    Eosinophils 2.10 0.00 - 6.90 %    Basophils 0.30 0.00 - 1.80 %    Immature Granulocytes 0.30 0.00 - 0.90 %    Nucleated RBC 0.00 0.00 - 0.20 /100 WBC    Neutrophils (Absolute) 6.04 1.82 - 7.42 K/uL    Lymphs (Absolute) 2.27 1.00 - 4.80 K/uL    Monos (Absolute) 0.33 0.00 - 0.85 K/uL    Eos (Absolute) 0.19 0.00 - 0.51 K/uL    Baso (Absolute) 0.03 0.00 - 0.12 K/uL    Immature Granulocytes (abs) 0.03 0.00 - 0.11 K/uL    NRBC (Absolute) 0.00 K/uL   COMP METABOLIC PANEL   Result Value Ref Range    Sodium 138 135 - 145 mmol/L    Potassium 3.9 3.6 - 5.5 mmol/L    Chloride 100 96 - 112 mmol/L    Co2 25 20 - 33  mmol/L    Anion Gap 13.0 7.0 - 16.0    Glucose 196 (H) 65 - 99 mg/dL    Bun 11 8 - 22 mg/dL    Creatinine 0.86 0.50 - 1.40 mg/dL    Calcium 9.1 8.5 - 10.5 mg/dL    AST(SGOT) 53 (H) 12 - 45 U/L    ALT(SGPT) 76 (H) 2 - 50 U/L    Alkaline Phosphatase 79 30 - 99 U/L    Total Bilirubin 0.5 0.1 - 1.5 mg/dL    Albumin 4.0 3.2 - 4.9 g/dL    Total Protein 7.1 6.0 - 8.2 g/dL    Globulin 3.1 1.9 - 3.5 g/dL    A-G Ratio 1.3 g/dL   LIPASE   Result Value Ref Range    Lipase 28 11 - 82 U/L   HCG QUAL SERUM   Result Value Ref Range    Beta-Hcg Qualitative Serum Negative Negative   CORRECTED CALCIUM   Result Value Ref Range    Correct Calcium 9.1 8.5 - 10.5 mg/dL   ESTIMATED GFR   Result Value Ref Range    GFR (CKD-EPI) 88 >60 mL/min/1.73 m 2        RADIOLOGY  I have independently interpreted the diagnostic imaging associated with this visit and am waiting the final reading from the radiologist.   My preliminary interpretation is as follows: Hepatic steatosis no acute process  Radiologist interpretation:   CT-ABDOMEN-PELVIS WITH   Final Result      1.  No acute abnormality in the abdomen or pelvis.   2.  Hepatic steatosis.          COURSE & MEDICAL DECISION MAKING    ED Observation Status? Yes; I am placing the patient in to an observation status due to a diagnostic uncertainty as well as therapeutic intensity. Patient placed in observation status at 1:12 PM, 5/28/2023.     Observation plan is as follows: Establish an IV, perform extensive blood testing.  Administer parenteral nausea and pain medication as well as IV fluids.  Perform serial abdominal exams    Upon Reevaluation, the patient's condition has: Improved; and will be discharged.    Patient discharged from ED Observation status at 1520 (Time) today's (Date).     INITIAL ASSESSMENT, COURSE AND PLAN  Care Narrative:     This is a very pleasant 37-year-old female presents here with several weeks of epigastric discomfort nausea and vomiting with darker colored stools.  She is  sent from urgent care here for evaluation and higher level of care.  On exam she had reassuring vital signs.  Specifically no high fever hypotension tachycardia or hypoxia.  On exam she had some mild reproducible epigastric discomfort but no peritoneal signs.  Laboratory studies were extensive including CBC which did not demonstrate any leukocytosis bandemia or anemia.  Metabolic panel revealed mild elevation of glucose and mild transaminitis but lipase and electrolytes are normal.  I was concerned about possible peptic ulcer disease pancreatitis colitis appendicitis choledocholithiasis not exclusively.  CT scan of the abdomen pelvis was reassuring.  Specifically there is no acute process to merit hospital admission antibiotic therapy or surgical consultation.  I do have concern she could have nonperforated none actively bleeding peptic ulcer disease.  I will start her on Nexium Carafate and Zofran and refer her to gastroenterology for outpatient follow-up    HYDRATION: Based on the patient's presentation of Acute Vomiting the patient was given IV fluids. IV Hydration was used because oral hydration was not adequate alone. Upon recheck following hydration, the patient was improved.      ADDITIONAL PROBLEM LIST    DISPOSITION AND DISCUSSIONS  I have discussed management of the patient with the following physicians and YUE's: None    Discussion of management with other QHP or appropriate source(s): None    Escalation of care considered, and ultimately not performed:acute inpatient care management, however at this time, the patient is most appropriate for outpatient management    Barriers to care at this time, including but not limited to: None    Decision tools and prescription drugs considered including, but not limited to: Patient will be provided prescriptions of Zofran, Carafate and Nexium    FINAL DIAGNOSIS  Epigastric pain  Possible peptic ulcer disease       Electronically signed by: Antoine Trent M.D.,  5/28/2023 2:10 PM

## 2023-05-28 NOTE — PROGRESS NOTES
Subjective:     Lindsey Gomes is a 37 y.o. female who presents for Stool Color Change (Black and dark stools, diarrhea, R lower back pain, x 3 weeks )    Patient presents to the urgent care with reports of black/tarry stools.  She has had 1 episode of black stools that occurred this morning.  She does not endorse that she has consumed anything in the past 24 to 48 hours that may have contributed to the color or consistency of the stool.  Associated symptoms include diarrhea x one month that she experiences hourly.  Diarrhea is watery, brown, green.  In addition, she has new onset right-sided back pain that she describes as constant and radiating to her right upper quadrant.  She has been having this pain for approximately 2 weeks and feels that it is worsening.  Associated symptoms include feeling feverish, dysuria, flatus and nausea. Pertinent negatives include no belching, constipation, fever, frequency, headaches, hematochezia, hematuria, melena, myalgias, vomiting or weight loss. She reports taking ibuprofen 800 mg x daily for the past two weeks.  No PPI use.  No reports of recent travel, contact with contaminated water.  She endorses a personal history of hepatic disease.    Review of Systems   Constitutional:  Positive for malaise/fatigue. Negative for chills, diaphoresis, fever and weight loss.   Respiratory:  Negative for cough, shortness of breath and wheezing.    Cardiovascular:  Negative for chest pain.   Gastrointestinal:  Positive for abdominal pain, blood in stool, diarrhea and nausea. Negative for constipation, heartburn, melena and vomiting.   Genitourinary:  Positive for dysuria. Negative for flank pain, frequency, hematuria and urgency.   Musculoskeletal:  Positive for back pain. Negative for falls, joint pain, myalgias and neck pain.        Right-sided back pain   Neurological:  Negative for headaches.   All other systems reviewed and are negative.      PMH:   Past Medical History:    Diagnosis Date    Anxiety     Dx 2004    Anxiety disorder     Bipolar affective (HCC)     Depression     Dx in 2004    Heart murmur     Dx as a child,  Pt states no longer having it.    HTN (hypertension) 7/9/2018    Hypertension     Renal disorder     reports sever kidney infection when pregnant in 2014    Snoring     no sleep study     ALLERGIES:   Allergies   Allergen Reactions    Pcn [Penicillins] Hives and Rash     SURGHX:   Past Surgical History:   Procedure Laterality Date    [place] LAP,DIAGNOSTIC ABDOMEN N/A 5/27/2020    Procedure: PELVISCOPY;  Surgeon: Hussein Gordon M.D.;  Location: [facility];  Service: Obstetrics    [place] REMOVAL OF CONTRACEPTIVE CAPSUL Left 5/27/2020    Procedure: REMOVAL, Nexplanon;  Surgeon: Hussein Gordon M.D.;  Location: [facility];  Service: Obstetrics    SALPINGECTOMY Bilateral 5/27/2020    Procedure: SALPINGECTOMY;  Surgeon: Hussein Gordon M.D.;  Location: [facility];  Service: Obstetrics    REPEAT C SECTION  11/6/2014    Performed by Stephani Swanson M.D. at [facility]    RHINOPLASTY  2005    CRANIOTOMY      open cranium at 3 months old    MAMMOPLASTY REDUCTION Bilateral      SOCHX:   Social History     Socioeconomic History    Marital status: Single   Tobacco Use    Smoking status: Never    Smokeless tobacco: Never   Vaping Use    Vaping Use: Never used   Substance and Sexual Activity    Alcohol use: Not Currently    Drug use: Not Currently     Types: Marijuana    Sexual activity: Yes     Partners: Male   Social History Narrative    ** Merged History Encounter **         ** Merged History Encounter **          FH:   Family History   Problem Relation Age of Onset    Thyroid Mother     Depression Father     Heart Disease Father     Diabetes Father     Heart Disease Maternal Grandfather     Diabetes Maternal Grandfather          Objective:   BP (!) 146/80   Pulse 76   Temp 36.5 °C (97.7 °F)   Resp 16   " Ht 1.499 m (4' 11\")   Wt 92.1 kg (203 lb)   SpO2 100%   BMI 41.00 kg/m²     Physical Exam  Vitals reviewed.   Constitutional:       General: She is not in acute distress.     Appearance: Normal appearance. She is not ill-appearing, toxic-appearing or diaphoretic.   HENT:      Head: Normocephalic and atraumatic.      Right Ear: Tympanic membrane, ear canal and external ear normal.      Left Ear: Tympanic membrane and ear canal normal.      Nose: Nose normal.      Mouth/Throat:      Mouth: Mucous membranes are moist.   Eyes:      Extraocular Movements: Extraocular movements intact.      Conjunctiva/sclera: Conjunctivae normal.      Pupils: Pupils are equal, round, and reactive to light.   Cardiovascular:      Rate and Rhythm: Normal rate and regular rhythm.      Pulses: Normal pulses.      Heart sounds: Normal heart sounds.   Pulmonary:      Effort: Pulmonary effort is normal. No respiratory distress.      Breath sounds: Normal breath sounds. No stridor. No rhonchi or rales.   Chest:      Chest wall: No tenderness.   Abdominal:      General: Abdomen is flat. Bowel sounds are normal. There is no distension.      Palpations: Abdomen is soft. There is no shifting dullness, fluid wave, hepatomegaly, splenomegaly, mass or pulsatile mass.      Tenderness: There is abdominal tenderness in the right upper quadrant. There is right CVA tenderness. There is no left CVA tenderness, guarding or rebound. Negative signs include Schultz's sign, Rovsing's sign, McBurney's sign, psoas sign and obturator sign.      Hernia: No hernia is present.       Musculoskeletal:         General: Normal range of motion.      Cervical back: Normal and normal range of motion.      Thoracic back: Tenderness present. No swelling, edema, deformity, signs of trauma, lacerations, spasms or bony tenderness. Normal range of motion. No scoliosis.      Lumbar back: Normal.        Back:       Comments: Pain not reproducible with palpation over the right " thoracic region.  Negative for paraspinal tenderness on the right side.  No skin changes, bony tenderness, trauma, edema, spasms, swelling, deformities.   Skin:     General: Skin is warm and dry.   Neurological:      Mental Status: She is alert.   Psychiatric:         Mood and Affect: Mood normal.         Behavior: Behavior normal.         Thought Content: Thought content normal.           Assessment/Plan:   Assessment      1. Right upper quadrant abdominal pain  - POCT Urine Dipstick Glucose  - POCT Pregnancy    2. Black tarry stools    POCT UA and pregnancy negative in clinic.  Based on patient's symptoms, symptom duration, presence of black tarry stools, increasing right upper quadrant abdominal pain, limited resources in urgent care, and diagnostic uncertainty, patient referred to emergency department for further evaluation and treatment.  Patient stable at the time of transfer.  The patient is hemodynamically stable.  She will be transported to [facility] via private vehicle.  She is accompanied by her young son.  All questions answered.  Patient verbalized understanding and is in agreement with this plan of care.    I personally reviewed prior external notes and test results pertinent to today's visit.  I have independently reviewed and interpreted all diagnostics ordered during this urgent care visit.     This dictation has been created using voice recognition software. The accuracy of the dictation is limited by the abilities of the software. I expect there may be some errors of grammar and possibly content. I made every attempt to manually correct the errors within my dictation. However, errors related to voice recognition software may still exist and should be interpreted within the appropriate context.    This note was electronically signed by DONNA Pritchett

## 2023-05-28 NOTE — ED NOTES
Updated with the plan of care. Medicated for pain per mar order, allergies verified.  Placed on supplemental 02@ 2l/nc. Pt's 02 sats fluctuates between 86%-92% on ra while at rest.

## 2023-08-26 ENCOUNTER — OFFICE VISIT (OUTPATIENT)
Dept: URGENT CARE | Facility: CLINIC | Age: 38
End: 2023-08-26
Payer: MEDICAID

## 2023-08-26 VITALS
HEIGHT: 59 IN | BODY MASS INDEX: 41.47 KG/M2 | SYSTOLIC BLOOD PRESSURE: 124 MMHG | WEIGHT: 205.7 LBS | DIASTOLIC BLOOD PRESSURE: 88 MMHG | TEMPERATURE: 97 F | RESPIRATION RATE: 16 BRPM | OXYGEN SATURATION: 97 % | HEART RATE: 71 BPM

## 2023-08-26 DIAGNOSIS — F31.9 BIPOLAR AFFECTIVE DISORDER, REMISSION STATUS UNSPECIFIED (HCC): ICD-10-CM

## 2023-08-26 DIAGNOSIS — M54.9 DORSALGIA: ICD-10-CM

## 2023-08-26 DIAGNOSIS — I10 PRIMARY HYPERTENSION: ICD-10-CM

## 2023-08-26 LAB
APPEARANCE UR: NORMAL
BILIRUB UR STRIP-MCNC: NEGATIVE MG/DL
COLOR UR AUTO: NORMAL
GLUCOSE UR STRIP.AUTO-MCNC: NEGATIVE MG/DL
KETONES UR STRIP.AUTO-MCNC: NEGATIVE MG/DL
LEUKOCYTE ESTERASE UR QL STRIP.AUTO: NEGATIVE
NITRITE UR QL STRIP.AUTO: NEGATIVE
PH UR STRIP.AUTO: 5.5 [PH] (ref 5–8)
PROT UR QL STRIP: 30 MG/DL
RBC UR QL AUTO: NORMAL
SP GR UR STRIP.AUTO: 1.02
UROBILINOGEN UR STRIP-MCNC: 0.2 MG/DL

## 2023-08-26 PROCEDURE — 3074F SYST BP LT 130 MM HG: CPT | Performed by: FAMILY MEDICINE

## 2023-08-26 PROCEDURE — 81002 URINALYSIS NONAUTO W/O SCOPE: CPT | Performed by: FAMILY MEDICINE

## 2023-08-26 PROCEDURE — 99214 OFFICE O/P EST MOD 30 MIN: CPT | Performed by: FAMILY MEDICINE

## 2023-08-26 PROCEDURE — 3079F DIAST BP 80-89 MM HG: CPT | Performed by: FAMILY MEDICINE

## 2023-08-26 RX ORDER — IBUPROFEN 600 MG/1
600 TABLET ORAL EVERY 8 HOURS PRN
Qty: 15 TABLET | Refills: 0 | Status: SHIPPED | OUTPATIENT
Start: 2023-08-26 | End: 2024-02-11

## 2023-08-26 ASSESSMENT — ENCOUNTER SYMPTOMS
MYALGIAS: 1
BACK PAIN: 1

## 2023-08-26 ASSESSMENT — FIBROSIS 4 INDEX: FIB4 SCORE: 1.07

## 2023-08-26 NOTE — PROGRESS NOTES
Subjective     Lnidsey Gomes is a 38 y.o. female who presents with Back Pain ((R) side middle back pain X 4 days )      - This is a very pleasant 38 y.o. who has come to the walk-in clinic today for ~3-4 days w/ Rt sided mid back pain. Woke up with it. Worse w/ movement/position, better rest. No fever, no trauma, no bowel/bladder dysfunction or lower limb weakness/heaviness.     Hx HTN and bipolar, stable on current meds           ALLERGIES:  Pcn [penicillins]     PMH:  Past Medical History:   Diagnosis Date    Anxiety     Dx 2004    Anxiety disorder     Bipolar affective (HCC)     Depression     Dx in 2004    Fatty liver     Heart murmur     Dx as a child,  Pt states no longer having it.    HTN (hypertension) 07/09/2018    Hypertension     Renal disorder     reports sever kidney infection when pregnant in 2014    Snoring     no sleep study        PSH:  Past Surgical History:   Procedure Laterality Date    [place] LAP,DIAGNOSTIC ABDOMEN N/A 5/27/2020    Procedure: PELVISCOPY;  Surgeon: Hussein Gordon M.D.;  [facility];  Service: Obstetrics    MD REMOVAL OF CONTRACEPTIVE CAPSUL Left 5/27/2020    Procedure: REMOVAL, Nexplanon;  Surgeon: Hussein Gordon M.D.;  Location: [facility];  Service: Obstetrics    SALPINGECTOMY Bilateral 5/27/2020    Procedure: SALPINGECTOMY;  Surgeon: Hussein Gordon M.D.;  Location: [facility];  Service: Obstetrics    REPEAT C SECTION  11/6/2014    Performed by Stephani Swanson M.D. at [facility]    RHINOPLASTY  2005    CRANIOTOMY      open cranium at 3 months old    MAMMOPLASTY REDUCTION Bilateral        MEDS:    Current Outpatient Medications:     ibuprofen (MOTRIN) 600 MG Tab, Take 1 Tablet by mouth every 8 hours as needed for Mild Pain., Disp: 15 Tablet, Rfl: 0    sucralfate (CARAFATE) 1 GM Tab, Take 1 Tablet by mouth 4 Times a Day,Before Meals and at Bedtime., Disp: 120 Tablet, Rfl: 3     "diazePAM (VALIUM) 2 MG Tab, PLEASE SEE ATTACHED FOR DETAILED DIRECTIONS, Disp: , Rfl:     prazosin (MINIPRESS) 5 MG Cap, TAKE 1 CAPSULE BY MOUTH AT BEDTIME FOR NIGHTMARES-ASSOCIATED WITH PTSD. ICD-10 F43.10, Disp: , Rfl:     metFORMIN ER (GLUCOPHAGE XR) 500 MG TABLET SR 24 HR, Take 500 mg by mouth every day., Disp: , Rfl:     methylphenidate (RITALIN) 10 MG Tab, PLEASE SEE ATTACHED FOR DETAILED DIRECTIONS, Disp: , Rfl:     ARIPiprazole (ABILIFY) 10 MG Tab, Take 10 mg by mouth every day., Disp: , Rfl:     escitalopram (LEXAPRO) 20 MG tablet, Take 20 mg by mouth every day., Disp: , Rfl:     hydrOXYzine HCl (ATARAX) 25 MG Tab, Take 25 mg by mouth 3 times a day as needed for Anxiety., Disp: , Rfl:     metoprolol SR (TOPROL XL) 50 MG TABLET SR 24 HR, Take 100 mg by mouth every evening. Indications: High Blood Pressure Disorder, Disp: , Rfl:     ** I have documented what I find to be significant in regards to past medical, social, family and surgical history  in my HPI or under PMH/PSH/FH review section, otherwise it is noncontributory **         HPI    Review of Systems   Musculoskeletal:  Positive for back pain and myalgias.   All other systems reviewed and are negative.             Objective     /88 (BP Location: Right arm, Patient Position: Sitting, BP Cuff Size: Large adult long)   Pulse 71   Temp 36.1 °C (97 °F) (Temporal)   Resp 16   Ht 1.499 m (4' 11\")   Wt 93.3 kg (205 lb 11.2 oz)   SpO2 97%   BMI 41.55 kg/m²      Physical Exam  Vitals and nursing note reviewed.   Constitutional:       General: She is not in acute distress.     Appearance: Normal appearance. She is well-developed.   HENT:      Head: Normocephalic.   Cardiovascular:      Heart sounds: Normal heart sounds. No murmur heard.  Pulmonary:      Effort: Pulmonary effort is normal. No respiratory distress.      Breath sounds: Normal breath sounds.   Musculoskeletal:        Back:       Comments: No wounds  No gross deformity  No discoloration " or rash  Bilateral lower extremity strength intact.  +2 patella reflex  Negative straight leg raise.   Some pain to palp/rom    Neurological:      Mental Status: She is alert.      Motor: No abnormal muscle tone.   Psychiatric:         Mood and Affect: Mood normal.         Behavior: Behavior normal.                             Assessment & Plan     1. Dorsalgia  POCT Urinalysis    ibuprofen (MOTRIN) 600 MG Tab      2. Primary hypertension        3. Bipolar affective disorder, remission status unspecified (HCC)            Seems MSK, trial of above    - Dx, plan & d/c instructions discussed   - Rest, stay hydrated, OTC Tylenol as needed      Follow up with your regular primary care providers office for a recheck on today's visit. ER if not improving in 2-3 days or if feeling/getting worse.     Patient left in stable condition     POCT results reviewed/discussed    Pertinent prior lab work and/or imaging studies in Epic have been reviewed by me today on day of this visit.      Pertinent prior office visit notes in [facility] have been reviewed by me today on day of this visit.

## 2023-08-26 NOTE — LETTER
August 26, 2023         Patient: Lindsey Gomes   YOB: 1985   Date of Visit: 8/26/2023           To Whom it May Concern:    Lindsey Gomes was seen in my clinic on 8/26/2023. She may return to work in 1-3 days.    If you have any questions or concerns, please don't hesitate to call.        Sincerely,           Shar Phillips M.D.  Electronically Signed

## 2024-02-11 ENCOUNTER — OFFICE VISIT (OUTPATIENT)
Dept: URGENT CARE | Facility: CLINIC | Age: 39
End: 2024-02-11
Payer: MEDICAID

## 2024-02-11 VITALS
TEMPERATURE: 98.1 F | DIASTOLIC BLOOD PRESSURE: 108 MMHG | HEIGHT: 59 IN | SYSTOLIC BLOOD PRESSURE: 168 MMHG | HEART RATE: 94 BPM | BODY MASS INDEX: 41.45 KG/M2 | WEIGHT: 205.6 LBS | OXYGEN SATURATION: 96 % | RESPIRATION RATE: 20 BRPM

## 2024-02-11 DIAGNOSIS — R81 GLUCOSE FOUND IN URINE ON EXAMINATION: ICD-10-CM

## 2024-02-11 DIAGNOSIS — E11.9 TYPE 2 DIABETES MELLITUS WITHOUT COMPLICATION, WITHOUT LONG-TERM CURRENT USE OF INSULIN (HCC): ICD-10-CM

## 2024-02-11 DIAGNOSIS — M54.50 ACUTE RIGHT-SIDED LOW BACK PAIN WITHOUT SCIATICA: ICD-10-CM

## 2024-02-11 DIAGNOSIS — R03.0 ELEVATED BLOOD PRESSURE READING: ICD-10-CM

## 2024-02-11 LAB
APPEARANCE UR: CLEAR
BILIRUB UR STRIP-MCNC: NEGATIVE MG/DL
COLOR UR AUTO: YELLOW
GLUCOSE BLD-MCNC: 333 MG/DL (ref 65–99)
GLUCOSE UR STRIP.AUTO-MCNC: NORMAL MG/DL
KETONES UR STRIP.AUTO-MCNC: NEGATIVE MG/DL
LEUKOCYTE ESTERASE UR QL STRIP.AUTO: NEGATIVE
NITRITE UR QL STRIP.AUTO: NEGATIVE
PH UR STRIP.AUTO: 5.5 [PH] (ref 5–8)
PROT UR QL STRIP: NEGATIVE MG/DL
RBC UR QL AUTO: NORMAL
SP GR UR STRIP.AUTO: 1.02
UROBILINOGEN UR STRIP-MCNC: NORMAL MG/DL

## 2024-02-11 PROCEDURE — 3080F DIAST BP >= 90 MM HG: CPT | Performed by: PHYSICIAN ASSISTANT

## 2024-02-11 PROCEDURE — 99214 OFFICE O/P EST MOD 30 MIN: CPT | Performed by: PHYSICIAN ASSISTANT

## 2024-02-11 PROCEDURE — 93000 ELECTROCARDIOGRAM COMPLETE: CPT | Performed by: PHYSICIAN ASSISTANT

## 2024-02-11 PROCEDURE — 3077F SYST BP >= 140 MM HG: CPT | Performed by: PHYSICIAN ASSISTANT

## 2024-02-11 PROCEDURE — 81002 URINALYSIS NONAUTO W/O SCOPE: CPT | Performed by: PHYSICIAN ASSISTANT

## 2024-02-11 PROCEDURE — 82962 GLUCOSE BLOOD TEST: CPT | Performed by: PHYSICIAN ASSISTANT

## 2024-02-11 RX ORDER — CYCLOBENZAPRINE HCL 5 MG
5-10 TABLET ORAL 3 TIMES DAILY PRN
Qty: 12 TABLET | Refills: 0 | Status: SHIPPED | OUTPATIENT
Start: 2024-02-11 | End: 2024-02-18

## 2024-02-11 RX ORDER — IBUPROFEN 600 MG/1
600 TABLET ORAL EVERY 8 HOURS PRN
Qty: 30 TABLET | Refills: 0 | Status: SHIPPED | OUTPATIENT
Start: 2024-02-11

## 2024-02-11 ASSESSMENT — ENCOUNTER SYMPTOMS
SENSORY CHANGE: 0
TINGLING: 0
BACK PAIN: 1
SHORTNESS OF BREATH: 0
CHILLS: 0
WEIGHT LOSS: 0
PALPITATIONS: 0
HEADACHES: 0
BLURRED VISION: 0
VOMITING: 0
FEVER: 0
NAUSEA: 0
ABDOMINAL PAIN: 0
FLANK PAIN: 0

## 2024-02-11 ASSESSMENT — FIBROSIS 4 INDEX: FIB4 SCORE: 1.07

## 2024-02-11 NOTE — PROGRESS NOTES
Subjective     Lindsey Gomes is a 38 y.o. female who presents with Back Pain (Back pain began yesterday, has worsened.)    HPI:  Lindsey Gomes is a 38 y.o. female who presents today for evaluation of back pain.  Patient reports that she has had some pain on her right lower back since yesterday.  She says that she tried taking some OTC ibuprofen but the back pain is worsened today.  She says the pain does get worse with certain movements.  She denies any bowel/bladder incontinence, focal weakness, or saddle anesthesia.  No abdominal pain or fever/chills.  No urinary discomfort but she has been having issues with urinary frequency.  Of note, ever, she had an A1c done last week which was 11.7%.  She currently takes metformin.  She says that her provider prescribed Ozempic but it has not been approved through her insurance yet.  She is not having any dizziness, shortness of breath, or chest pain.  Her blood pressure was also noted to be elevated today.  She says she has a history of hypertension and she does take metoprolol.  She says she has been taking the medication as prescribed.        Review of Systems   Constitutional:  Negative for chills, fever, malaise/fatigue and weight loss.   Eyes:  Negative for blurred vision.   Respiratory:  Negative for shortness of breath.    Cardiovascular:  Negative for chest pain and palpitations.   Gastrointestinal:  Negative for abdominal pain, nausea and vomiting.   Genitourinary:  Positive for frequency. Negative for dysuria, flank pain and hematuria.   Musculoskeletal:  Positive for back pain.   Neurological:  Negative for tingling, sensory change and headaches.             PMH:  has a past medical history of Anxiety, Anxiety disorder, Bipolar affective (HCC), Depression, Fatty liver, Heart murmur, HTN (hypertension) (07/09/2018), Hypertension, Renal disorder, and Snoring.    She has no past medical history of Addisons disease ([facility]), Adrenal disorder (HCC),  Allergy, Blood transfusion, Clotting disorder (HCC), COPD, Cushings syndrome (HCC), Diabetic neuropathy (HCC), Encounter for long-term (current) use of other medications, GERD (gastroesophageal reflux disease), Goiter, Head ache, Headache(784.0), HIV (human immunodeficiency virus infection), Hyperlipidemia, IBD (inflammatory bowel disease), Meningitis, Migraine, Muscle disorder, Osteoporosis, Parathyroid disorder ([facility]), Pituitary disease ([facility]), Sickle cell disease ([facility]), Substance abuse ([facility]), Ulcer, or Urinary tract infection, site not specified.  MEDS:   Current Outpatient Medications:     ibuprofen (MOTRIN) 600 MG Tab, Take 1 Tablet by mouth every 8 hours as needed for Moderate Pain., Disp: 30 Tablet, Rfl: 0    cyclobenzaprine (FLEXERIL) 5 mg tablet, Take 1-2 Tablets by mouth 3 times a day as needed for Moderate Pain or Muscle Spasms., Disp: 12 Tablet, Rfl: 0    sucralfate (CARAFATE) 1 GM Tab, Take 1 Tablet by mouth 4 Times a Day,Before Meals and at Bedtime. (Patient not taking: Reported on 2/11/2024), Disp: 120 Tablet, Rfl: 3    diazePAM (VALIUM) 2 MG Tab, PLEASE SEE ATTACHED FOR DETAILED DIRECTIONS, Disp: , Rfl:     prazosin (MINIPRESS) 5 MG Cap, TAKE 1 CAPSULE BY MOUTH AT BEDTIME FOR NIGHTMARES-ASSOCIATED WITH PTSD. ICD-10 F43.10, Disp: , Rfl:     metFORMIN ER (GLUCOPHAGE XR) 500 MG TABLET SR 24 HR, Take 500 mg by mouth every day., Disp: , Rfl:     methylphenidate (RITALIN) 10 MG Tab, PLEASE SEE ATTACHED FOR DETAILED DIRECTIONS, Disp: , Rfl:     ARIPiprazole (ABILIFY) 10 MG Tab, Take 10 mg by mouth every day., Disp: , Rfl:     escitalopram (LEXAPRO) 20 MG tablet, Take 20 mg by mouth every day., Disp: , Rfl:     hydrOXYzine HCl (ATARAX) 25 MG Tab, Take 25 mg by mouth 3 times a day as needed for Anxiety. (Patient not taking: Reported on 2/11/2024), Disp: , Rfl:     metoprolol SR (TOPROL XL) 50 MG TABLET SR 24 HR, Take 100 mg by mouth every evening. Indications: High Blood Pressure Disorder, Disp: , Rfl:  "  ALLERGIES:   Allergies   Allergen Reactions    Pcn [Penicillins] Hives and Rash     SURGHX:   Past Surgical History:   Procedure Laterality Date    [place] LAP,DIAGNOSTIC ABDOMEN N/A 5/27/2020    Procedure: PELVISCOPY;  Surgeon: Hussein Gordon M.D.;  Location: [facility];  Service: Obstetrics    [place] REMOVAL OF CONTRACEPTIVE CAPSUL Left 5/27/2020    Procedure: REMOVAL, Nexplanon;  Surgeon: Hussein Gordon M.D.;  Location: [facility];  Service: Obstetrics    SALPINGECTOMY Bilateral 5/27/2020    Procedure: SALPINGECTOMY;  Surgeon: Hussein Gordon M.D.;  Location: [facility];  Service: Obstetrics    REPEAT C SECTION  11/6/2014    Performed by Stephani Swanson M.D. at [facility] DELIVERY    RHINOPLASTY  2005    CRANIOTOMY      open cranium at 3 months old    MAMMOPLASTY REDUCTION Bilateral      SOCHX:  reports that she has never smoked. She has never used smokeless tobacco. She reports that she does not currently use alcohol. She reports that she does not currently use drugs after having used the following drugs: Marijuana.  FH: Family history was reviewed, no pertinent findings to report        Objective     BP (!) 168/108   Pulse 94   Temp 36.7 °C (98.1 °F) (Temporal)   Resp 20   Ht 1.499 m (4' 11\")   Wt 93.3 kg (205 lb 9.6 oz)   SpO2 96%   BMI 41.53 kg/m²      Physical Exam  Constitutional:       Appearance: She is well-developed.   HENT:      Head: Normocephalic and atraumatic.      Right Ear: External ear normal.      Left Ear: External ear normal.   Eyes:      Conjunctiva/sclera: Conjunctivae normal.      Pupils: Pupils are equal, round, and reactive to light.   Cardiovascular:      Rate and Rhythm: Normal rate and regular rhythm.   Pulmonary:      Effort: Pulmonary effort is normal.      Breath sounds: Normal breath sounds. No decreased breath sounds, wheezing, rhonchi or rales.   Abdominal:      General: Bowel sounds are normal.      " Tenderness: There is no abdominal tenderness. There is no right CVA tenderness or left CVA tenderness.   Musculoskeletal:      Cervical back: Normal range of motion.      Lumbar back: Spasms and tenderness present. No bony tenderness. Decreased range of motion. Negative right straight leg raise test and negative left straight leg raise test.   Lymphadenopathy:      Cervical: No cervical adenopathy.   Skin:     General: Skin is warm and dry.      Capillary Refill: Capillary refill takes less than 2 seconds.   Neurological:      Mental Status: She is alert and oriented to person, place, and time.   Psychiatric:         Behavior: Behavior normal.         Judgment: Judgment normal.                   EKG Interpretation - HR is 79 normal sinus rhythm, sinus bradycardia seen on EKG from 5/26/2020 2020 no longer present      POCT Glucose - 333    Assessment & Plan        1. Elevated blood pressure reading  - EKG - Clinic Performed    2. Acute right-sided low back pain without sciatica  - POCT Urinalysis  - ibuprofen (MOTRIN) 600 MG Tab; Take 1 Tablet by mouth every 8 hours as needed for Moderate Pain.  Dispense: 30 Tablet; Refill: 0  - cyclobenzaprine (FLEXERIL) 5 mg tablet; Take 1-2 Tablets by mouth 3 times a day as needed for Moderate Pain or Muscle Spasms.  Dispense: 12 Tablet; Refill: 0  - Apply ice/heat to the affected area    3. Glucose found in urine on examination  - POCT Glucose    4. Type 2 diabetes mellitus without complication, without long-term current use of insulin ([facility])  - POCT Glucose    Patient presenting to the  for evaluation of back pain. Back pain seems muscular in nature. We will treat with NSAIDs and muscle relaxers. Cautioned that the muscle relaxer might cause drowsiness. On exam it was noted that her BP was elevated. EKG normal. She is not endorsing and chest pain or SOB. Recommend that she continue to take her BP meds and keep a lof of her BP readings at home. Follow up with PCP if it remains  elevated. She was also noted to have > 1000 glucose on UA. POCT glucose was 333. No ketones in her urine. She is waiting for insurance approval for her Ozempic. Recommend that she contact her PCP office tomorrow so that they are aware that her glucose is in the mid 300 range. They may want to consider starting her on a different medication for now.

## 2024-02-11 NOTE — LETTER
February 11, 2024         Patient: Lindsey Gomes   YOB: 1985   Date of Visit: 2/11/2024           To Whom it May Concern:    Lindsey Gomes was seen in my clinic on 2/11/2024.  Please excuse her absence from work on 2/12/2024.      Sincerely,           Charlene Brantley P.A.-C.  Electronically Signed

## 2024-02-13 DIAGNOSIS — M54.50 ACUTE RIGHT-SIDED LOW BACK PAIN WITHOUT SCIATICA: ICD-10-CM

## 2024-02-18 ENCOUNTER — OFFICE VISIT (OUTPATIENT)
Dept: URGENT CARE | Facility: CLINIC | Age: 39
End: 2024-02-18
Payer: MEDICAID

## 2024-02-18 VITALS
HEART RATE: 111 BPM | HEIGHT: 59 IN | RESPIRATION RATE: 20 BRPM | WEIGHT: 203 LBS | BODY MASS INDEX: 40.92 KG/M2 | TEMPERATURE: 97.8 F | DIASTOLIC BLOOD PRESSURE: 98 MMHG | SYSTOLIC BLOOD PRESSURE: 146 MMHG | OXYGEN SATURATION: 96 %

## 2024-02-18 DIAGNOSIS — H61.21 IMPACTED CERUMEN OF RIGHT EAR: ICD-10-CM

## 2024-02-18 DIAGNOSIS — R09.81 NASAL CONGESTION: ICD-10-CM

## 2024-02-18 DIAGNOSIS — H60.501 ACUTE OTITIS EXTERNA OF RIGHT EAR, UNSPECIFIED TYPE: ICD-10-CM

## 2024-02-18 DIAGNOSIS — M62.838 MUSCLE SPASM: ICD-10-CM

## 2024-02-18 DIAGNOSIS — J06.9 VIRAL URI: ICD-10-CM

## 2024-02-18 PROCEDURE — 3080F DIAST BP >= 90 MM HG: CPT | Performed by: PHYSICIAN ASSISTANT

## 2024-02-18 PROCEDURE — 99213 OFFICE O/P EST LOW 20 MIN: CPT | Performed by: PHYSICIAN ASSISTANT

## 2024-02-18 PROCEDURE — 3077F SYST BP >= 140 MM HG: CPT | Performed by: PHYSICIAN ASSISTANT

## 2024-02-18 RX ORDER — OFLOXACIN 3 MG/ML
10 SOLUTION AURICULAR (OTIC) DAILY
Qty: 14 ML | Refills: 0 | Status: SHIPPED | OUTPATIENT
Start: 2024-02-18 | End: 2024-02-25

## 2024-02-18 RX ORDER — METHOCARBAMOL 500 MG/1
TABLET, FILM COATED ORAL
Qty: 30 TABLET | Refills: 0 | Status: SHIPPED | OUTPATIENT
Start: 2024-02-18

## 2024-02-18 RX ORDER — FLUTICASONE PROPIONATE 50 MCG
1 SPRAY, SUSPENSION (ML) NASAL DAILY
Qty: 16 G | Refills: 0 | Status: SHIPPED | OUTPATIENT
Start: 2024-02-18

## 2024-02-18 ASSESSMENT — FIBROSIS 4 INDEX: FIB4 SCORE: 1.07

## 2024-02-18 NOTE — PROGRESS NOTES
Subjective:   Lindsey Gomes is a 38 y.o. female who presents for Otalgia (AD)  This is a pleasant 38-year-old female who presents with chief complaint of right ear pain itching and drainage.    Your symptoms started yesterday, did have mild preceding viral URI  H/o om as child  Subjective fevers, no chills  Ear pain 6/10, intermittent  Has had some drainage and itching  Does use q tips  Does have some pain lying on right ear  No concern for foreign body    She also reports that she was seen here in the urgent care for back pain several days ago.  She was given prescription for muscle relaxant but this was not covered by her insurance.  She was unable to pick it up.  She has ongoing back pain.        Review of Systems   HENT:  Positive for ear pain.        Medications:  ARIPiprazole Tabs  cyclobenzaprine  diazePAM Tabs  escitalopram  hydrOXYzine HCl Tabs  ibuprofen Tabs  metFORMIN ER Tb24  methylphenidate Tabs  metoprolol SR Tb24  prazosin Caps  sucralfate Tabs    Allergies:             Pcn [penicillins]    Surgical History:         Past Surgical History:   Procedure Laterality Date    OR LAP,DIAGNOSTIC ABDOMEN N/A 5/27/2020    Procedure: PELVISCOPY;  Surgeon: Hussein Gordon M.D.;  Location: [facility]ERY [facility];  Service: Obstetrics    OR REMOVAL OF CONTRACEPTIVE CAPSUL Left 5/27/2020    Procedure: REMOVAL, Nexplanon;  Surgeon: Hussein Gordon M.D.;  Location: [facility]ERY [facility];  Service: Obstetrics    SALPINGECTOMY Bilateral 5/27/2020    Procedure: SALPINGECTOMY;  Surgeon: Hussein Gordon M.D.;  Location: [facility]ERY [facility];  Service: Obstetrics    REPEAT C SECTION  11/6/2014    Performed by Stephani Swanson M.D. at [facility]    RHINOPLASTY  2005    CRANIOTOMY      open cranium at 3 months old    MAMMOPLASTY REDUCTION Bilateral        Past Social Hx:  Lindsey Gomes  reports that she has never smoked. She has never used smokeless tobacco. She  "reports that she does not currently use alcohol. She reports that she does not currently use drugs after having used the following drugs: Marijuana.     Past Family Hx:   Lindsey Gomes family history includes Depression in her father; Diabetes in her father and maternal grandfather; Heart Disease in her father and maternal grandfather; Thyroid in her mother.       Problem list, medications, and allergies reviewed by myself today in Epic.     Objective:     BP (!) 146/98   Pulse (!) 111   Temp 36.6 °C (97.8 °F) (Temporal)   Resp 20   Ht 1.499 m (4' 11\")   Wt 92.1 kg (203 lb)   SpO2 96%   BMI 41.00 kg/m²     Physical Exam  Vitals and nursing note reviewed.   Constitutional:       General: She is not in acute distress.     Appearance: Normal appearance. She is not ill-appearing or toxic-appearing.   HENT:      Head: Normocephalic.      Right Ear: Hearing and external ear normal. No decreased hearing noted. No drainage, swelling or tenderness. No foreign body. Tympanic membrane is not injected, erythematous or bulging.      Left Ear: Hearing and external ear normal. No decreased hearing noted. No drainage, swelling or tenderness. No foreign body. Tympanic membrane is not injected, erythematous or bulging.      Ears:      Comments: Tender with manipulation of the auricle and pressure to the tragus on the right side.  Some pain with otoscopy.  Cerumen impaction noted.  Post lavage no obvious TM erythema or bulging.     Nose: Congestion and rhinorrhea present.      Mouth/Throat:      Mouth: Mucous membranes are moist.      Pharynx: Oropharynx is clear. No oropharyngeal exudate or posterior oropharyngeal erythema.      Tonsils: No tonsillar exudate.   Eyes:      General:         Right eye: No discharge.         Left eye: No discharge.      Pupils: Pupils are equal, round, and reactive to light.   Cardiovascular:      Rate and Rhythm: Normal rate and regular rhythm.      Pulses: Normal pulses.      Heart " sounds: Normal heart sounds.   Pulmonary:      Effort: Pulmonary effort is normal. No respiratory distress.      Breath sounds: Normal breath sounds. No stridor or decreased air movement. No wheezing, rhonchi or rales.   Musculoskeletal:      Cervical back: Neck supple. No rigidity.   Lymphadenopathy:      Cervical: No cervical adenopathy.   Skin:     General: Skin is warm.   Neurological:      Mental Status: She is alert.         Assessment/Plan:     Diagnosis and Associated Orders:     1. Muscle spasm  - methocarbamol (ROBAXIN) 500 MG Tab; Take 1 every 6 hours as needed for pain/muscle spasms.  Dispense: 30 Tablet; Refill: 0    2. Acute otitis externa of right ear, unspecified type  - ofloxacin otic sol (FLOXIN OTIC) 0.3 % Solution; Administer 10 Drops into affected ear(s) every day for 7 days.  Dispense: 14 mL; Refill: 0    3. Nasal congestion  - fluticasone (FLONASE) 50 MCG/ACT nasal spray; Administer 1 Spray into affected nostril(S) every day.  Dispense: 16 g; Refill: 0    4. Impacted cerumen of right ear    5. Viral URI        Comments/MDM:  Patient presents with 2-day history of itching, drainage exam consistent with otitis externa.  She did have a cerumen impaction, post lavage no obvious evidence of otitis media.  Has had proceeding viral URI symptoms.  Will treat with otic drops.  Patient advised to contact me if ear pain worsens or develops fevers chills at which point we would consider coverage with an oral antibiotic for evolving OM.  Patient vies to avoid placing anything in her ear, keep ear canal dry.  Workup provided.     Vital signs stable and reassuring.  Lungs are clear to auscultation bilaterally.  Patient is not hypoxic.  They are afebrile.  They are nontachypneic.  No indication for antibiotics at this time.  Did discuss signs, symptoms, and timing of secondary bacterial infection and indications for return visit.    Symptomatic and supportive care:   Plenty of oral hydration and rest   Over  the counter cough suppressant as directed.  Tylenol or ibuprofen for pain and fever as directed.   Warm salt water gargles for sore throat, soft foods, cool liquids.   Saline nasal spray, Flonase, and/or otc sudafed (if no history of hypertension) as a decongestant.   Infection control measures at home. Stay away from people, Hand washing, covering sneeze/cough, disinfect surfaces.   Remain home from work, school, and other populated environments while ill.  Overall, the patient is well-appearing. They are not hypoxic, afebrile, and a normal pulmonary exam.    History of hypertension, blood pressure elevated in the clinic today.  Measured x 2.  Patient advised to follow-up with PCP within the next 30 days.  Denies chest pain headache or shortness of breath    Patient should to proceed to ED for development of symptoms including but not limited to shortness of breath breath, respiratory distress, increased fever, persistent symptoms, or worsening symptoms not manageable at home.      Did place order for methocarbamol which appears to be covered by her insurance    I personally reviewed prior external notes and test results pertinent to today's visit. Supportive care, natural history, differential diagnoses, and indications for immediate follow-up discussed. Return to clinic or go to ED if symptoms worsen or persist.  Red flag symptoms discussed.  Patient/Parent/Guardian voices understanding. Follow-up with your primary care provider in 3-5 days.  All side effects of medication discussed including allergic response, GI upset, tendon injury, rash, sedation etc    Please note that this dictation was created using voice recognition software. I have made a reasonable attempt to correct obvious errors, but I expect that there are errors of grammar and possibly content that I did not discover before finalizing the note.    This note was electronically signed by Lizeth Hernandez PA-C

## 2024-02-18 NOTE — LETTER
February 18, 2024    To Whom It May Concern:         This is confirmation that Lindsey Gomes attended her scheduled appointment with Lizeth Hernandez P.A.-C. on 2/18/24.  Please excuse patient from work tomorrow 2/19.         If you have any questions please do not hesitate to call me at the phone number listed below.    Sincerely,          Lizeth Hernandez P.A.-C.  [phone]

## 2024-05-13 RX ORDER — METHOCARBAMOL 500 MG/1
TABLET, FILM COATED ORAL
Refills: 0 | OUTPATIENT
Start: 2024-05-13

## (undated) DEVICE — ELECTRODE 850 FOAM ADHESIVE - HYDROGEL RADIOTRNSPRNT (50/PK)

## (undated) DEVICE — TUBE CONNECTING SUCTION - CLEAR PLASTIC STERILE 72 IN (50EA/CA)

## (undated) DEVICE — CANISTER SUCTION 3000ML MECHANICAL FILTER AUTO SHUTOFF MEDI-VAC NONSTERILE LF DISP  (40EA/CA)

## (undated) DEVICE — ARMREST CRADLE FOAM - (2PR/PK 12PR/CA)

## (undated) DEVICE — MASK ANESTHESIA ADULT  - (100/CA)

## (undated) DEVICE — DERMABOND ADVANCED - (12EA/BX)

## (undated) DEVICE — PAD SANITARY 11IN MAXI IND WRAPPED  (12EA/PK 24PK/CA)

## (undated) DEVICE — GOWN SURGEONS X-LARGE - DISP. (30/CA)

## (undated) DEVICE — CANISTER SUCTION RIGID RED 1500CC (40EA/CA)

## (undated) DEVICE — SUTURE GENERAL

## (undated) DEVICE — SET LEADWIRE 5 LEAD BEDSIDE DISPOSABLE ECG (1SET OF 5/EA)

## (undated) DEVICE — PACK LAPAROSCOPY - (1/CA)

## (undated) DEVICE — TRAY SRGPRP PVP IOD WT PRP - (20/CA)

## (undated) DEVICE — GOWN WARMING STANDARD FLEX - (30/CA)

## (undated) DEVICE — PROTECTOR ULNA NERVE - (36PR/CA)

## (undated) DEVICE — UTERINE MANIPULATOR 4.5MM ZSI - 1151 12/BX

## (undated) DEVICE — GLOVE BIOGEL PI ORTHO SZ 7 PF LF (40PR/BX)

## (undated) DEVICE — LIGASURE LAPAROSCOPIC 5MM - (6EA/CA)

## (undated) DEVICE — SODIUM CHL IRRIGATION 0.9% 1000ML (12EA/CA)

## (undated) DEVICE — NEEDLE INSFL 120MM 14GA VRRS - (20/BX)

## (undated) DEVICE — SUCTION INSTRUMENT YANKAUER BULBOUS TIP W/O VENT (50EA/CA)

## (undated) DEVICE — SENSOR SPO2 NEO LNCS ADHESIVE (20/BX) SEE USER NOTES

## (undated) DEVICE — KIT ANESTHESIA W/CIRCUIT & 3/LT BAG W/FILTER (20EA/CA)

## (undated) DEVICE — GLOVE SZ 7 BIOGEL PI MICRO - PF LF (50PR/BX 4BX/CA)

## (undated) DEVICE — LACTATED RINGERS INJ 1000 ML - (14EA/CA 60CA/PF)

## (undated) DEVICE — TROCAR 5X100 SEPARTATOR ADV - FIXATION (6/BX)

## (undated) DEVICE — SUTURE 4-0 MONOCRYL PLUS PS-2 - 27 INCH (36/BX)

## (undated) DEVICE — WATER IRRIGATION STERILE 1000ML (12EA/CA)